# Patient Record
Sex: FEMALE | Race: BLACK OR AFRICAN AMERICAN | NOT HISPANIC OR LATINO | Employment: FULL TIME | URBAN - NONMETROPOLITAN AREA
[De-identification: names, ages, dates, MRNs, and addresses within clinical notes are randomized per-mention and may not be internally consistent; named-entity substitution may affect disease eponyms.]

---

## 2021-07-19 ENCOUNTER — OFFICE VISIT (OUTPATIENT)
Dept: FAMILY MEDICINE | Facility: CLINIC | Age: 66
End: 2021-07-19
Payer: COMMERCIAL

## 2021-07-19 VITALS
DIASTOLIC BLOOD PRESSURE: 88 MMHG | BODY MASS INDEX: 25.93 KG/M2 | HEIGHT: 65 IN | WEIGHT: 155.63 LBS | SYSTOLIC BLOOD PRESSURE: 150 MMHG | OXYGEN SATURATION: 98 % | RESPIRATION RATE: 16 BRPM | TEMPERATURE: 98 F | HEART RATE: 86 BPM

## 2021-07-19 DIAGNOSIS — E55.9 VITAMIN D DEFICIENCY: Chronic | ICD-10-CM

## 2021-07-19 DIAGNOSIS — E03.9 HYPOTHYROIDISM, UNSPECIFIED TYPE: Chronic | ICD-10-CM

## 2021-07-19 DIAGNOSIS — I10 HYPERTENSION, UNSPECIFIED TYPE: Primary | Chronic | ICD-10-CM

## 2021-07-19 PROCEDURE — 99214 PR OFFICE/OUTPT VISIT, EST, LEVL IV, 30-39 MIN: ICD-10-PCS | Mod: ,,, | Performed by: FAMILY MEDICINE

## 2021-07-19 PROCEDURE — 99214 OFFICE O/P EST MOD 30 MIN: CPT | Mod: ,,, | Performed by: FAMILY MEDICINE

## 2021-07-19 RX ORDER — LEVOTHYROXINE SODIUM 88 UG/1
1 TABLET ORAL DAILY
COMMUNITY
Start: 2021-05-22 | End: 2021-07-19 | Stop reason: SDUPTHER

## 2021-07-19 RX ORDER — POTASSIUM CHLORIDE 750 MG/1
10 TABLET, EXTENDED RELEASE ORAL DAILY
Qty: 90 TABLET | Refills: 1 | Status: SHIPPED | OUTPATIENT
Start: 2021-07-19 | End: 2022-02-25 | Stop reason: SDUPTHER

## 2021-07-19 RX ORDER — AMLODIPINE BESYLATE 10 MG/1
10 TABLET ORAL DAILY
Qty: 90 TABLET | Refills: 1 | Status: SHIPPED | OUTPATIENT
Start: 2021-07-19 | End: 2022-02-25 | Stop reason: SDUPTHER

## 2021-07-19 RX ORDER — LEVOTHYROXINE SODIUM 88 UG/1
88 TABLET ORAL DAILY
Qty: 90 TABLET | Refills: 1 | Status: SHIPPED | OUTPATIENT
Start: 2021-07-19 | End: 2022-02-25 | Stop reason: SDUPTHER

## 2021-07-19 RX ORDER — FERROUS SULFATE, DRIED 160(50) MG
1 TABLET, EXTENDED RELEASE ORAL 2 TIMES DAILY WITH MEALS
COMMUNITY

## 2021-07-19 RX ORDER — POTASSIUM CHLORIDE 750 MG/1
1 TABLET, EXTENDED RELEASE ORAL DAILY
COMMUNITY
Start: 2021-05-22 | End: 2021-07-19 | Stop reason: SDUPTHER

## 2021-07-19 RX ORDER — CARVEDILOL 6.25 MG/1
6.25 TABLET ORAL 2 TIMES DAILY
COMMUNITY
Start: 2021-05-22 | End: 2021-07-19 | Stop reason: SDUPTHER

## 2021-07-19 RX ORDER — AMLODIPINE BESYLATE 10 MG/1
1 TABLET ORAL DAILY
COMMUNITY
Start: 2021-05-22 | End: 2021-07-19 | Stop reason: SDUPTHER

## 2021-07-19 RX ORDER — LISINOPRIL 20 MG/1
20 TABLET ORAL DAILY
Qty: 90 TABLET | Refills: 1 | Status: SHIPPED | OUTPATIENT
Start: 2021-07-19 | End: 2022-02-25 | Stop reason: SDUPTHER

## 2021-07-19 RX ORDER — LISINOPRIL 20 MG/1
20 TABLET ORAL DAILY
COMMUNITY
Start: 2021-05-22 | End: 2021-07-19 | Stop reason: SDUPTHER

## 2021-07-19 RX ORDER — CARVEDILOL 6.25 MG/1
6.25 TABLET ORAL 2 TIMES DAILY
Qty: 90 TABLET | Refills: 1 | Status: SHIPPED | OUTPATIENT
Start: 2021-07-19 | End: 2021-11-22

## 2021-07-19 RX ORDER — ACETAMINOPHEN 500 MG
5000 TABLET ORAL DAILY
COMMUNITY

## 2021-07-19 RX ORDER — MAGNESIUM 250 MG
TABLET ORAL ONCE
COMMUNITY

## 2021-09-30 ENCOUNTER — OFFICE VISIT (OUTPATIENT)
Dept: FAMILY MEDICINE | Facility: CLINIC | Age: 66
End: 2021-09-30
Payer: MEDICARE

## 2021-09-30 VITALS
RESPIRATION RATE: 20 BRPM | HEIGHT: 65 IN | SYSTOLIC BLOOD PRESSURE: 146 MMHG | HEART RATE: 90 BPM | WEIGHT: 159 LBS | BODY MASS INDEX: 26.49 KG/M2 | DIASTOLIC BLOOD PRESSURE: 80 MMHG | OXYGEN SATURATION: 98 % | TEMPERATURE: 98 F

## 2021-09-30 DIAGNOSIS — R30.0 DYSURIA: Primary | ICD-10-CM

## 2021-09-30 LAB
BILIRUB SERPL-MCNC: NEGATIVE MG/DL
BLOOD URINE, POC: ABNORMAL
COLOR, POC UA: YELLOW
GLUCOSE UR QL STRIP: NEGATIVE
KETONES UR QL STRIP: NEGATIVE
LEUKOCYTE ESTERASE URINE, POC: NEGATIVE
NITRITE, POC UA: NEGATIVE
PH, POC UA: 7
PROTEIN, POC: NEGATIVE
SPECIFIC GRAVITY, POC UA: 1.02
UROBILINOGEN, POC UA: 0.2

## 2021-09-30 PROCEDURE — 3077F SYST BP >= 140 MM HG: CPT | Mod: ,,, | Performed by: NURSE PRACTITIONER

## 2021-09-30 PROCEDURE — 3008F PR BODY MASS INDEX (BMI) DOCUMENTED: ICD-10-PCS | Mod: ,,, | Performed by: NURSE PRACTITIONER

## 2021-09-30 PROCEDURE — 3079F DIAST BP 80-89 MM HG: CPT | Mod: ,,, | Performed by: NURSE PRACTITIONER

## 2021-09-30 PROCEDURE — 3008F BODY MASS INDEX DOCD: CPT | Mod: ,,, | Performed by: NURSE PRACTITIONER

## 2021-09-30 PROCEDURE — 81003 URINALYSIS AUTO W/O SCOPE: CPT | Mod: RHCUB | Performed by: NURSE PRACTITIONER

## 2021-09-30 PROCEDURE — 99213 PR OFFICE/OUTPT VISIT, EST, LEVL III, 20-29 MIN: ICD-10-PCS | Mod: ,,, | Performed by: NURSE PRACTITIONER

## 2021-09-30 PROCEDURE — 99213 OFFICE O/P EST LOW 20 MIN: CPT | Mod: ,,, | Performed by: NURSE PRACTITIONER

## 2021-09-30 PROCEDURE — 87086 URINE CULTURE/COLONY COUNT: CPT | Mod: ,,, | Performed by: CLINICAL MEDICAL LABORATORY

## 2021-09-30 PROCEDURE — 4010F PR ACE/ARB THEARPY RXD/TAKEN: ICD-10-PCS | Mod: ,,, | Performed by: NURSE PRACTITIONER

## 2021-09-30 PROCEDURE — 3079F PR MOST RECENT DIASTOLIC BLOOD PRESSURE 80-89 MM HG: ICD-10-PCS | Mod: ,,, | Performed by: NURSE PRACTITIONER

## 2021-09-30 PROCEDURE — 3077F PR MOST RECENT SYSTOLIC BLOOD PRESSURE >= 140 MM HG: ICD-10-PCS | Mod: ,,, | Performed by: NURSE PRACTITIONER

## 2021-09-30 PROCEDURE — 87086 CULTURE, URINE: ICD-10-PCS | Mod: ,,, | Performed by: CLINICAL MEDICAL LABORATORY

## 2021-09-30 PROCEDURE — 4010F ACE/ARB THERAPY RXD/TAKEN: CPT | Mod: ,,, | Performed by: NURSE PRACTITIONER

## 2021-09-30 RX ORDER — NITROFURANTOIN 25; 75 MG/1; MG/1
100 CAPSULE ORAL 2 TIMES DAILY
Qty: 14 CAPSULE | Refills: 0 | Status: SHIPPED | OUTPATIENT
Start: 2021-09-30 | End: 2022-02-25

## 2021-10-02 LAB — UA COMPLETE W REFLEX CULTURE PNL UR: NORMAL

## 2022-02-25 ENCOUNTER — OFFICE VISIT (OUTPATIENT)
Dept: FAMILY MEDICINE | Facility: CLINIC | Age: 67
End: 2022-02-25
Payer: MEDICARE

## 2022-02-25 VITALS
TEMPERATURE: 98 F | HEIGHT: 65 IN | DIASTOLIC BLOOD PRESSURE: 78 MMHG | RESPIRATION RATE: 20 BRPM | HEART RATE: 88 BPM | OXYGEN SATURATION: 98 % | SYSTOLIC BLOOD PRESSURE: 142 MMHG | WEIGHT: 157.81 LBS | BODY MASS INDEX: 26.29 KG/M2

## 2022-02-25 DIAGNOSIS — I10 PRIMARY HYPERTENSION: Primary | Chronic | ICD-10-CM

## 2022-02-25 DIAGNOSIS — E55.9 VITAMIN D DEFICIENCY: Chronic | ICD-10-CM

## 2022-02-25 DIAGNOSIS — E03.9 ACQUIRED HYPOTHYROIDISM: Chronic | ICD-10-CM

## 2022-02-25 LAB
ANION GAP SERPL CALCULATED.3IONS-SCNC: 9 MMOL/L (ref 7–16)
BASOPHILS # BLD AUTO: 0.01 K/UL (ref 0–0.2)
BASOPHILS NFR BLD AUTO: 0.2 % (ref 0–1)
BILIRUB UR QL STRIP: NEGATIVE
BUN SERPL-MCNC: 13 MG/DL (ref 7–18)
BUN/CREAT SERPL: 18 (ref 6–20)
CALCIUM SERPL-MCNC: 9.8 MG/DL (ref 8.5–10.1)
CHLORIDE SERPL-SCNC: 106 MMOL/L (ref 98–107)
CHOLEST SERPL-MCNC: 195 MG/DL (ref 0–200)
CHOLEST/HDLC SERPL: 2 {RATIO}
CLARITY UR: CLEAR
CO2 SERPL-SCNC: 28 MMOL/L (ref 21–32)
COLOR UR: YELLOW
CREAT SERPL-MCNC: 0.73 MG/DL (ref 0.55–1.02)
CREAT UR-MCNC: 32 MG/DL (ref 28–219)
DIFFERENTIAL METHOD BLD: ABNORMAL
EOSINOPHIL # BLD AUTO: 0.05 K/UL (ref 0–0.5)
EOSINOPHIL NFR BLD AUTO: 1.2 % (ref 1–4)
ERYTHROCYTE [DISTWIDTH] IN BLOOD BY AUTOMATED COUNT: 13.5 % (ref 11.5–14.5)
GLUCOSE SERPL-MCNC: 87 MG/DL (ref 74–106)
GLUCOSE UR STRIP-MCNC: NEGATIVE MG/DL
HCT VFR BLD AUTO: 37 % (ref 38–47)
HDLC SERPL-MCNC: 96 MG/DL (ref 40–60)
HGB BLD-MCNC: 11.4 G/DL (ref 12–16)
IMM GRANULOCYTES # BLD AUTO: 0.02 K/UL (ref 0–0.04)
IMM GRANULOCYTES NFR BLD: 0.5 % (ref 0–0.4)
KETONES UR STRIP-SCNC: NEGATIVE MG/DL
LDLC SERPL CALC-MCNC: 86 MG/DL
LDLC/HDLC SERPL: 0.9 {RATIO}
LEUKOCYTE ESTERASE UR QL STRIP: NEGATIVE
LYMPHOCYTES # BLD AUTO: 1.62 K/UL (ref 1–4.8)
LYMPHOCYTES NFR BLD AUTO: 37.4 % (ref 27–41)
MCH RBC QN AUTO: 29.4 PG (ref 27–31)
MCHC RBC AUTO-ENTMCNC: 30.8 G/DL (ref 32–36)
MCV RBC AUTO: 95.4 FL (ref 80–96)
MICROALBUMIN UR-MCNC: 1.4 MG/DL (ref 0–2.8)
MICROALBUMIN/CREAT RATIO PNL UR: 43.8 MG/G (ref 0–30)
MONOCYTES # BLD AUTO: 0.38 K/UL (ref 0–0.8)
MONOCYTES NFR BLD AUTO: 8.8 % (ref 2–6)
MPC BLD CALC-MCNC: 12 FL (ref 9.4–12.4)
NEUTROPHILS # BLD AUTO: 2.25 K/UL (ref 1.8–7.7)
NEUTROPHILS NFR BLD AUTO: 51.9 % (ref 53–65)
NITRITE UR QL STRIP: NEGATIVE
NONHDLC SERPL-MCNC: 99 MG/DL
NRBC # BLD AUTO: 0 X10E3/UL
NRBC, AUTO (.00): 0 %
PH UR STRIP: 7 PH UNITS
PLATELET # BLD AUTO: 275 K/UL (ref 150–400)
POTASSIUM SERPL-SCNC: 4.1 MMOL/L (ref 3.5–5.1)
PROT UR QL STRIP: NEGATIVE
RBC # BLD AUTO: 3.88 M/UL (ref 4.2–5.4)
RBC # UR STRIP: NEGATIVE /UL
SODIUM SERPL-SCNC: 139 MMOL/L (ref 136–145)
SP GR UR STRIP: 1.01
T4 FREE SERPL-MCNC: 1.37 NG/DL (ref 0.76–1.46)
TRIGL SERPL-MCNC: 66 MG/DL (ref 35–150)
TSH SERPL DL<=0.005 MIU/L-ACNC: 0.38 UIU/ML (ref 0.36–3.74)
UROBILINOGEN UR STRIP-ACNC: 0.2 MG/DL
VLDLC SERPL-MCNC: 13 MG/DL
WBC # BLD AUTO: 4.33 K/UL (ref 4.5–11)

## 2022-02-25 PROCEDURE — 84443 THYROID PANEL: ICD-10-PCS | Mod: ,,, | Performed by: CLINICAL MEDICAL LABORATORY

## 2022-02-25 PROCEDURE — 80061 LIPID PANEL: CPT | Mod: ,,, | Performed by: CLINICAL MEDICAL LABORATORY

## 2022-02-25 PROCEDURE — 81003 URINALYSIS AUTO W/O SCOPE: CPT | Mod: QW,,, | Performed by: CLINICAL MEDICAL LABORATORY

## 2022-02-25 PROCEDURE — 4010F PR ACE/ARB THEARPY RXD/TAKEN: ICD-10-PCS | Mod: ,,, | Performed by: FAMILY MEDICINE

## 2022-02-25 PROCEDURE — 3077F SYST BP >= 140 MM HG: CPT | Mod: ,,, | Performed by: FAMILY MEDICINE

## 2022-02-25 PROCEDURE — 99214 OFFICE O/P EST MOD 30 MIN: CPT | Mod: ,,, | Performed by: FAMILY MEDICINE

## 2022-02-25 PROCEDURE — 1126F AMNT PAIN NOTED NONE PRSNT: CPT | Mod: ,,, | Performed by: FAMILY MEDICINE

## 2022-02-25 PROCEDURE — 82043 UR ALBUMIN QUANTITATIVE: CPT | Mod: ,,, | Performed by: CLINICAL MEDICAL LABORATORY

## 2022-02-25 PROCEDURE — 81003 URINALYSIS: ICD-10-PCS | Mod: QW,,, | Performed by: CLINICAL MEDICAL LABORATORY

## 2022-02-25 PROCEDURE — 99214 PR OFFICE/OUTPT VISIT, EST, LEVL IV, 30-39 MIN: ICD-10-PCS | Mod: ,,, | Performed by: FAMILY MEDICINE

## 2022-02-25 PROCEDURE — 3077F PR MOST RECENT SYSTOLIC BLOOD PRESSURE >= 140 MM HG: ICD-10-PCS | Mod: ,,, | Performed by: FAMILY MEDICINE

## 2022-02-25 PROCEDURE — 82570 ASSAY OF URINE CREATININE: CPT | Mod: ,,, | Performed by: CLINICAL MEDICAL LABORATORY

## 2022-02-25 PROCEDURE — 1159F MED LIST DOCD IN RCRD: CPT | Mod: ,,, | Performed by: FAMILY MEDICINE

## 2022-02-25 PROCEDURE — 85025 CBC WITH DIFFERENTIAL: ICD-10-PCS | Mod: ,,, | Performed by: CLINICAL MEDICAL LABORATORY

## 2022-02-25 PROCEDURE — 3008F BODY MASS INDEX DOCD: CPT | Mod: ,,, | Performed by: FAMILY MEDICINE

## 2022-02-25 PROCEDURE — 4010F ACE/ARB THERAPY RXD/TAKEN: CPT | Mod: ,,, | Performed by: FAMILY MEDICINE

## 2022-02-25 PROCEDURE — 80061 LIPID PANEL: ICD-10-PCS | Mod: ,,, | Performed by: CLINICAL MEDICAL LABORATORY

## 2022-02-25 PROCEDURE — 3008F PR BODY MASS INDEX (BMI) DOCUMENTED: ICD-10-PCS | Mod: ,,, | Performed by: FAMILY MEDICINE

## 2022-02-25 PROCEDURE — 85025 COMPLETE CBC W/AUTO DIFF WBC: CPT | Mod: ,,, | Performed by: CLINICAL MEDICAL LABORATORY

## 2022-02-25 PROCEDURE — 1160F PR REVIEW ALL MEDS BY PRESCRIBER/CLIN PHARMACIST DOCUMENTED: ICD-10-PCS | Mod: ,,, | Performed by: FAMILY MEDICINE

## 2022-02-25 PROCEDURE — 3078F DIAST BP <80 MM HG: CPT | Mod: ,,, | Performed by: FAMILY MEDICINE

## 2022-02-25 PROCEDURE — 3078F PR MOST RECENT DIASTOLIC BLOOD PRESSURE < 80 MM HG: ICD-10-PCS | Mod: ,,, | Performed by: FAMILY MEDICINE

## 2022-02-25 PROCEDURE — 1160F RVW MEDS BY RX/DR IN RCRD: CPT | Mod: ,,, | Performed by: FAMILY MEDICINE

## 2022-02-25 PROCEDURE — 84439 ASSAY OF FREE THYROXINE: CPT | Mod: ,,, | Performed by: CLINICAL MEDICAL LABORATORY

## 2022-02-25 PROCEDURE — 1126F PR PAIN SEVERITY QUANTIFIED, NO PAIN PRESENT: ICD-10-PCS | Mod: ,,, | Performed by: FAMILY MEDICINE

## 2022-02-25 PROCEDURE — 80048 BASIC METABOLIC PNL TOTAL CA: CPT | Mod: ,,, | Performed by: CLINICAL MEDICAL LABORATORY

## 2022-02-25 PROCEDURE — 84439 THYROID PANEL: ICD-10-PCS | Mod: ,,, | Performed by: CLINICAL MEDICAL LABORATORY

## 2022-02-25 PROCEDURE — 82570 MICROALBUMIN / CREATININE RATIO URINE: ICD-10-PCS | Mod: ,,, | Performed by: CLINICAL MEDICAL LABORATORY

## 2022-02-25 PROCEDURE — 82043 MICROALBUMIN / CREATININE RATIO URINE: ICD-10-PCS | Mod: ,,, | Performed by: CLINICAL MEDICAL LABORATORY

## 2022-02-25 PROCEDURE — 84443 ASSAY THYROID STIM HORMONE: CPT | Mod: ,,, | Performed by: CLINICAL MEDICAL LABORATORY

## 2022-02-25 PROCEDURE — 80048 BASIC METABOLIC PANEL: ICD-10-PCS | Mod: ,,, | Performed by: CLINICAL MEDICAL LABORATORY

## 2022-02-25 PROCEDURE — 1159F PR MEDICATION LIST DOCUMENTED IN MEDICAL RECORD: ICD-10-PCS | Mod: ,,, | Performed by: FAMILY MEDICINE

## 2022-02-25 RX ORDER — LISINOPRIL 20 MG/1
20 TABLET ORAL DAILY
Qty: 90 TABLET | Refills: 1 | Status: SHIPPED | OUTPATIENT
Start: 2022-02-25 | End: 2022-08-29 | Stop reason: SDUPTHER

## 2022-02-25 RX ORDER — LEVOTHYROXINE SODIUM 88 UG/1
88 TABLET ORAL DAILY
Qty: 90 TABLET | Refills: 1 | Status: SHIPPED | OUTPATIENT
Start: 2022-02-25 | End: 2022-08-29 | Stop reason: SDUPTHER

## 2022-02-25 RX ORDER — AMLODIPINE BESYLATE 10 MG/1
10 TABLET ORAL DAILY
Qty: 90 TABLET | Refills: 1 | Status: SHIPPED | OUTPATIENT
Start: 2022-02-25 | End: 2022-08-29 | Stop reason: SDUPTHER

## 2022-02-25 RX ORDER — CARVEDILOL 6.25 MG/1
6.25 TABLET ORAL 2 TIMES DAILY
Qty: 180 TABLET | Refills: 1 | Status: SHIPPED | OUTPATIENT
Start: 2022-02-25 | End: 2022-08-29 | Stop reason: SDUPTHER

## 2022-02-25 RX ORDER — POTASSIUM CHLORIDE 750 MG/1
10 TABLET, EXTENDED RELEASE ORAL DAILY
Qty: 90 TABLET | Refills: 1 | Status: SHIPPED | OUTPATIENT
Start: 2022-02-25 | End: 2022-08-29 | Stop reason: SDUPTHER

## 2022-02-25 NOTE — PROGRESS NOTES
Myles Sweet MD    [address] [place] [address]     PATIENT NAME: Yoanna Hurtado  : 1955  DATE: 22  MRN: [id number]      Billing Provider: Myles Sweet MD  Level of Service: DE OFFICE/OUTPT VISIT, EST, LEVL IV, 30-39 MIN  Patient PCP Information     Provider PCP Type    Myles Sweet MD General          Reason for Visit / Chief Complaint: Follow-up (Follow up on chronic health problems and medication refills)       Update PCP  Update Chief Complaint         History of Present Illness / Problem Focused Workflow     Yoanna Hurtado presents to the clinic with Follow-up (Follow up on chronic health problems and medication refills)     HPI    Review of Systems     Review of Systems   Constitutional: Negative for activity change, appetite change, chills, fatigue and fever.   HENT: Negative for nasal congestion, ear pain, hearing loss, postnasal drip and sore throat.    Respiratory: Negative for cough, chest tightness, shortness of breath and wheezing.    Cardiovascular: Negative for chest pain, palpitations, leg swelling and claudication.   Gastrointestinal: Negative for abdominal pain, change in bowel habit, constipation, diarrhea, nausea, vomiting and change in bowel habit.   Genitourinary: Negative for dysuria.   Musculoskeletal: Negative for arthralgias, back pain, gait problem and myalgias.   Integumentary:  Negative for rash.   Neurological: Negative for weakness and headaches.   Psychiatric/Behavioral: Negative for suicidal ideas. The patient is not nervous/anxious.         Medical / Social / Family History     Past Medical History:   Diagnosis Date    Hypothyroidism        Past Surgical History:   Procedure Laterality Date    HYSTERECTOMY      TOTAL THYROIDECTOMY         Social History  Ms.  reports that she has never smoked. She has never used smokeless tobacco. She reports that she does not drink alcohol and does not use  drugs.    Family History  Ms.'s family history is not on file.    Medications and Allergies     Medications  Outpatient Medications Marked as Taking for the 2/25/22 encounter (Office Visit) with Myles Sweet MD   Medication Sig Dispense Refill    calcium-vitamin D3 (OS-HORTENCIA 500 + D3) 500 mg-5 mcg (200 unit) per tablet Take 1 tablet by mouth 2 (two) times daily with meals.      cholecalciferol, vitamin D3, 125 mcg (5,000 unit) Tab Take 5,000 Units by mouth once daily.      magnesium 250 mg Tab Take by mouth once.      [DISCONTINUED] amLODIPine (NORVASC) 10 MG tablet Take 1 tablet (10 mg total) by mouth once daily. 90 tablet 1    [DISCONTINUED] carvediloL (COREG) 6.25 MG tablet Take 1 tablet by mouth twice daily 180 tablet 0    [DISCONTINUED] lisinopriL (PRINIVIL,ZESTRIL) 20 MG tablet Take 1 tablet (20 mg total) by mouth once daily. 90 tablet 1    [DISCONTINUED] potassium chloride (KLOR-CON) 10 MEQ TbSR Take 1 tablet (10 mEq total) by mouth once daily. 90 tablet 1    [DISCONTINUED] SYNTHROID 88 mcg tablet Take 1 tablet (88 mcg total) by mouth once daily. 90 tablet 1       Allergies  Review of patient's allergies indicates:   Allergen Reactions    Penicillins     Sulfa (sulfonamide antibiotics)        Physical Examination     Vitals:    02/25/22 0824   BP: (!) 142/78   Pulse:    Resp:    Temp:      Physical Exam  Vitals and nursing note reviewed.   Constitutional:       General: She is not in acute distress.     Appearance: Normal appearance. She is not ill-appearing.   Eyes:      Extraocular Movements: Extraocular movements intact.      Pupils: Pupils are equal, round, and reactive to light.   Cardiovascular:      Rate and Rhythm: Normal rate and regular rhythm.      Heart sounds: Normal heart sounds.   Pulmonary:      Effort: Pulmonary effort is normal.      Breath sounds: Normal breath sounds.   Abdominal:      General: Bowel sounds are normal.      Palpations: Abdomen is soft.   Musculoskeletal:          General: Normal range of motion.   Skin:     Findings: No rash.   Neurological:      General: No focal deficit present.      Mental Status: She is alert and oriented to person, place, and time. Mental status is at baseline.   Psychiatric:         Mood and Affect: Mood normal.         Behavior: Behavior normal.          Assessment and Plan (including Health Maintenance)      Problem List  Smart Sets  Document Outside HM   :    Plan:         Health Maintenance Due   Topic Date Due    TETANUS VACCINE  Never done    Mammogram  Never done    DEXA Scan  Never done    Colorectal Cancer Screening  Never done       Problem List Items Addressed This Visit        Cardiac/Vascular    Hypertension - Primary (Chronic)    Current Assessment & Plan      - Goal blood pressure for most patients is less than 130/85. Both numbers are important. If you have questions about your specific goal blood pressure, please ask at your clinic visits.   - Check blood pressure outside of clinic, record the numbers, and bring the log to all your office visits.   - Take a daily, 81mg Aspirin, unless instructed to take a different dose for another medical purpose. Also do not take Aspirin if you have a history of adverse reaction to Aspirin.   - If you have any chest pain, SOB, or other concerning symptoms, report these immediately, or go to the nearest ER.    - Recommend cardiovascular exercise, at least 3 times per week, for at least 15 minutes.             Relevant Medications    lisinopriL (PRINIVIL,ZESTRIL) 20 MG tablet    carvediloL (COREG) 6.25 MG tablet    amLODIPine (NORVASC) 10 MG tablet    Other Relevant Orders    Basic Metabolic Panel    CBC Auto Differential    Lipid Panel    Microalbumin/Creatinine Ratio, Urine    Urinalysis       Endocrine    Hypothyroidism (Chronic)    Relevant Medications    SYNTHROID 88 mcg tablet    Other Relevant Orders    Thyroid Panel    Vitamin D deficiency (Chronic)        Primary hypertension  -      lisinopriL (PRINIVIL,ZESTRIL) 20 MG tablet; Take 1 tablet (20 mg total) by mouth once daily. For BLOOD PRESSURE  Dispense: 90 tablet; Refill: 1  -     carvediloL (COREG) 6.25 MG tablet; Take 1 tablet (6.25 mg total) by mouth 2 (two) times daily. For BLOOD PRESSURE  Dispense: 180 tablet; Refill: 1  -     amLODIPine (NORVASC) 10 MG tablet; Take 1 tablet (10 mg total) by mouth once daily. For BLOOD PRESSURE  Dispense: 90 tablet; Refill: 1  -     Basic Metabolic Panel; Future; Expected date: 02/25/2022  -     CBC Auto Differential; Future; Expected date: 02/25/2022  -     Lipid Panel; Future; Expected date: 02/25/2022  -     Microalbumin/Creatinine Ratio, Urine; Future; Expected date: 02/25/2022  -     Urinalysis; Future; Expected date: 02/25/2022    Acquired hypothyroidism  -     SYNTHROID 88 mcg tablet; Take 1 tablet (88 mcg total) by mouth once daily. For THYROID  Dispense: 90 tablet; Refill: 1  -     Thyroid Panel; Future; Expected date: 02/25/2022    Vitamin D deficiency    Other orders  -     potassium chloride (KLOR-CON) 10 MEQ TbSR; Take 1 tablet (10 mEq total) by mouth once daily.  Dispense: 90 tablet; Refill: 1       Health Maintenance Topics with due status: Not Due       Topic Last Completion Date    Lipid Panel 02/08/2021       Procedures     Future Appointments   Date Time Provider Department Center   8/15/2022 10:00 AM AWV NURSE, [facility] FAMILY [facility]IC[facility] KAYLEIGH Morales        Follow up in about 6 months (around 8/25/2022) for chronic health problems.     Signature:  Myles Sweet MD  [address] [place] [address]  Phone #: [phone]  Fax #: [phone]    Date of encounter: 2/25/22    There are no Patient Instructions on file for this visit.

## 2022-08-29 ENCOUNTER — OFFICE VISIT (OUTPATIENT)
Dept: FAMILY MEDICINE | Facility: CLINIC | Age: 67
End: 2022-08-29
Payer: MEDICARE

## 2022-08-29 VITALS
RESPIRATION RATE: 18 BRPM | SYSTOLIC BLOOD PRESSURE: 138 MMHG | HEART RATE: 98 BPM | BODY MASS INDEX: 25.76 KG/M2 | HEIGHT: 65 IN | TEMPERATURE: 98 F | OXYGEN SATURATION: 97 % | WEIGHT: 154.63 LBS | DIASTOLIC BLOOD PRESSURE: 72 MMHG

## 2022-08-29 DIAGNOSIS — Z11.59 SCREENING FOR VIRAL DISEASE: ICD-10-CM

## 2022-08-29 DIAGNOSIS — E03.9 ACQUIRED HYPOTHYROIDISM: Chronic | ICD-10-CM

## 2022-08-29 DIAGNOSIS — E89.40 POST HYSTERECTOMY MENOPAUSE: ICD-10-CM

## 2022-08-29 DIAGNOSIS — E55.9 VITAMIN D DEFICIENCY: Chronic | ICD-10-CM

## 2022-08-29 DIAGNOSIS — Z90.710 POST HYSTERECTOMY MENOPAUSE: ICD-10-CM

## 2022-08-29 DIAGNOSIS — Z12.31 ENCOUNTER FOR SCREENING MAMMOGRAM FOR MALIGNANT NEOPLASM OF BREAST: ICD-10-CM

## 2022-08-29 DIAGNOSIS — I10 PRIMARY HYPERTENSION: Chronic | ICD-10-CM

## 2022-08-29 DIAGNOSIS — Z00.00 ENCOUNTER FOR INITIAL ANNUAL WELLNESS VISIT (AWV) IN MEDICARE PATIENT: Primary | ICD-10-CM

## 2022-08-29 LAB — HCV AB SER QL: NORMAL

## 2022-08-29 PROCEDURE — G0438 PPPS, INITIAL VISIT: HCPCS | Mod: ,,, | Performed by: FAMILY MEDICINE

## 2022-08-29 PROCEDURE — 3060F POS MICROALBUMINURIA REV: CPT | Mod: ,,, | Performed by: FAMILY MEDICINE

## 2022-08-29 PROCEDURE — 1101F PR PT FALLS ASSESS DOC 0-1 FALLS W/OUT INJ PAST YR: ICD-10-PCS | Mod: ,,, | Performed by: FAMILY MEDICINE

## 2022-08-29 PROCEDURE — 1126F PR PAIN SEVERITY QUANTIFIED, NO PAIN PRESENT: ICD-10-PCS | Mod: ,,, | Performed by: FAMILY MEDICINE

## 2022-08-29 PROCEDURE — 3008F PR BODY MASS INDEX (BMI) DOCUMENTED: ICD-10-PCS | Mod: ,,, | Performed by: FAMILY MEDICINE

## 2022-08-29 PROCEDURE — 3078F DIAST BP <80 MM HG: CPT | Mod: ,,, | Performed by: FAMILY MEDICINE

## 2022-08-29 PROCEDURE — 3075F PR MOST RECENT SYSTOLIC BLOOD PRESS GE 130-139MM HG: ICD-10-PCS | Mod: ,,, | Performed by: FAMILY MEDICINE

## 2022-08-29 PROCEDURE — 1159F PR MEDICATION LIST DOCUMENTED IN MEDICAL RECORD: ICD-10-PCS | Mod: ,,, | Performed by: FAMILY MEDICINE

## 2022-08-29 PROCEDURE — 1126F AMNT PAIN NOTED NONE PRSNT: CPT | Mod: ,,, | Performed by: FAMILY MEDICINE

## 2022-08-29 PROCEDURE — 1159F MED LIST DOCD IN RCRD: CPT | Mod: ,,, | Performed by: FAMILY MEDICINE

## 2022-08-29 PROCEDURE — 3288F PR FALLS RISK ASSESSMENT DOCUMENTED: ICD-10-PCS | Mod: ,,, | Performed by: FAMILY MEDICINE

## 2022-08-29 PROCEDURE — 1160F RVW MEDS BY RX/DR IN RCRD: CPT | Mod: ,,, | Performed by: FAMILY MEDICINE

## 2022-08-29 PROCEDURE — 3288F FALL RISK ASSESSMENT DOCD: CPT | Mod: ,,, | Performed by: FAMILY MEDICINE

## 2022-08-29 PROCEDURE — 3066F NEPHROPATHY DOC TX: CPT | Mod: ,,, | Performed by: FAMILY MEDICINE

## 2022-08-29 PROCEDURE — G0438 PR WELCOME MEDICARE ANNUAL WELLNESS INITIAL VISIT: ICD-10-PCS | Mod: ,,, | Performed by: FAMILY MEDICINE

## 2022-08-29 PROCEDURE — 1160F PR REVIEW ALL MEDS BY PRESCRIBER/CLIN PHARMACIST DOCUMENTED: ICD-10-PCS | Mod: ,,, | Performed by: FAMILY MEDICINE

## 2022-08-29 PROCEDURE — 3075F SYST BP GE 130 - 139MM HG: CPT | Mod: ,,, | Performed by: FAMILY MEDICINE

## 2022-08-29 PROCEDURE — 3078F PR MOST RECENT DIASTOLIC BLOOD PRESSURE < 80 MM HG: ICD-10-PCS | Mod: ,,, | Performed by: FAMILY MEDICINE

## 2022-08-29 PROCEDURE — 4010F PR ACE/ARB THEARPY RXD/TAKEN: ICD-10-PCS | Mod: ,,, | Performed by: FAMILY MEDICINE

## 2022-08-29 PROCEDURE — 3008F BODY MASS INDEX DOCD: CPT | Mod: ,,, | Performed by: FAMILY MEDICINE

## 2022-08-29 PROCEDURE — 86803 HEPATITIS C AB TEST: CPT | Mod: ,,, | Performed by: CLINICAL MEDICAL LABORATORY

## 2022-08-29 PROCEDURE — 3066F PR DOCUMENTATION OF TREATMENT FOR NEPHROPATHY: ICD-10-PCS | Mod: ,,, | Performed by: FAMILY MEDICINE

## 2022-08-29 PROCEDURE — 3060F PR POS MICROALBUMINURIA RESULT DOCUMENTED/REVIEW: ICD-10-PCS | Mod: ,,, | Performed by: FAMILY MEDICINE

## 2022-08-29 PROCEDURE — 1101F PT FALLS ASSESS-DOCD LE1/YR: CPT | Mod: ,,, | Performed by: FAMILY MEDICINE

## 2022-08-29 PROCEDURE — 4010F ACE/ARB THERAPY RXD/TAKEN: CPT | Mod: ,,, | Performed by: FAMILY MEDICINE

## 2022-08-29 PROCEDURE — 86803 HEPATITIS C ANTIBODY: ICD-10-PCS | Mod: ,,, | Performed by: CLINICAL MEDICAL LABORATORY

## 2022-08-29 RX ORDER — LISINOPRIL 20 MG/1
20 TABLET ORAL DAILY
Qty: 90 TABLET | Refills: 1 | Status: SHIPPED | OUTPATIENT
Start: 2022-08-29 | End: 2023-02-27 | Stop reason: SDUPTHER

## 2022-08-29 RX ORDER — LEVOTHYROXINE SODIUM 88 UG/1
88 TABLET ORAL DAILY
Qty: 90 TABLET | Refills: 1 | Status: SHIPPED | OUTPATIENT
Start: 2022-08-29 | End: 2023-02-27 | Stop reason: SDUPTHER

## 2022-08-29 RX ORDER — FLUTICASONE PROPIONATE 50 MCG
1 SPRAY, SUSPENSION (ML) NASAL DAILY
COMMUNITY
End: 2023-02-27 | Stop reason: SDUPTHER

## 2022-08-29 RX ORDER — CARVEDILOL 6.25 MG/1
6.25 TABLET ORAL 2 TIMES DAILY
Qty: 180 TABLET | Refills: 1 | Status: SHIPPED | OUTPATIENT
Start: 2022-08-29 | End: 2023-02-27 | Stop reason: SDUPTHER

## 2022-08-29 RX ORDER — POTASSIUM CHLORIDE 750 MG/1
10 TABLET, EXTENDED RELEASE ORAL DAILY
Qty: 90 TABLET | Refills: 1 | Status: SHIPPED | OUTPATIENT
Start: 2022-08-29 | End: 2023-08-31 | Stop reason: SDUPTHER

## 2022-08-29 RX ORDER — AMLODIPINE BESYLATE 10 MG/1
10 TABLET ORAL DAILY
Qty: 90 TABLET | Refills: 1 | Status: SHIPPED | OUTPATIENT
Start: 2022-08-29 | End: 2023-02-27 | Stop reason: SDUPTHER

## 2022-08-29 NOTE — PATIENT INSTRUCTIONS
Counseling and Referral of Other Preventative  (Italic type indicates deductible and co-insurance are waived)    Patient Name: Yoanna Hurtado  Today's Date: 8/29/2022    Health Maintenance         Date Due Completion Date    Hepatitis C Screening Never done ---    Mammogram Never done ---    DEXA Scan Never done ---    Shingles Vaccine (1 of 2) 02/25/2023 (Originally 8/17/2006) ---    Pneumococcal Vaccines (Age 65+) (1 - PCV) 02/25/2023 (Originally 8/17/2021) ---    TETANUS VACCINE 08/29/2023 (Originally 8/17/1974) ---    COVID-19 Vaccine (4 - Booster for Pfizer series) 08/29/2023 (Originally 2/23/2022) 10/23/2021    Influenza Vaccine (1) 09/01/2022 ---    Lipid Panel 02/25/2023 2/25/2022    Override on 2/8/2021: Done    Colorectal Cancer Screening 10/05/2025 10/5/2015          Orders Placed This Encounter   Procedures    Mammo Digital Screening Bilat    DXA Bone Density Spine And Hip    Hepatitis C Antibody

## 2022-08-29 NOTE — PROGRESS NOTES
[facility]      PATIENT NAME: Yoanna Hurtado   : 1955    AGE: 67 y.o. DATE: 2022   MRN: [id number]        Reason for Visit / Chief Complaint: Medicare AWV (Initial Medicare AWV )        Yoanna Hurtado presents for an Initial Medicare AWV today.     The following components were reviewed and updated:    Medical/Social/Family History:  Past Medical History:   Diagnosis Date    Hypertension     Hypothyroidism         History reviewed. No pertinent family history.     Social History     Tobacco Use   Smoking Status Never   Smokeless Tobacco Never      Social History     Substance and Sexual Activity   Alcohol Use Never       History reviewed. No pertinent family history.    Past Surgical History:   Procedure Laterality Date    HYSTERECTOMY      TOTAL THYROIDECTOMY           Allergies and Current Medications     Review of patient's allergies indicates:   Allergen Reactions    Penicillins     Sulfa (sulfonamide antibiotics)        Current Outpatient Medications:     calcium-vitamin D3 (OS-HORTENCIA 500 + D3) 500 mg-5 mcg (200 unit) per tablet, Take 1 tablet by mouth 2 (two) times daily with meals., Disp: , Rfl:     cholecalciferol, vitamin D3, 125 mcg (5,000 unit) Tab, Take 5,000 Units by mouth once daily., Disp: , Rfl:     fluticasone propionate (FLONASE) 50 mcg/actuation nasal spray, 1 spray by Each Nostril route once daily., Disp: , Rfl:     magnesium 250 mg Tab, Take by mouth once., Disp: , Rfl:     amLODIPine (NORVASC) 10 MG tablet, Take 1 tablet (10 mg total) by mouth once daily. For BLOOD PRESSURE, Disp: 90 tablet, Rfl: 1    carvediloL (COREG) 6.25 MG tablet, Take 1 tablet (6.25 mg total) by mouth 2 (two) times daily. For BLOOD PRESSURE, Disp: 180 tablet, Rfl: 1    lisinopriL (PRINIVIL,ZESTRIL) 20 MG tablet, Take 1 tablet (20 mg total) by mouth once daily. For BLOOD PRESSURE, Disp: 90 tablet, Rfl: 1    potassium chloride (KLOR-CON) 10 MEQ TbSR, Take 1  tablet (10 mEq total) by mouth once daily., Disp: 90 tablet, Rfl: 1    SYNTHROID 88 mcg tablet, Take 1 tablet (88 mcg total) by mouth once daily. For THYROID, Disp: 90 tablet, Rfl: 1    Health Risk Assessment   Fall Risk: no   Advance Directive:  Does not have an advanced directive. Verbal education and written education included in today's AVS.   Depression: PHQ9 score: 0   HTN:  DASH diet, exercise, weight management, med compliance, home BP monitoring, and follow-up discussed.   T2DM: No   Tobacco use: no  STI: not at risk    Alcohol misuse: no   Statin Use: no      Health Risk Assessment  What is your age?: 65-69  Are you male or female?: Female  During the past four weeks, how much have you been bothered by emotional problems such as feeling anxious, depressed, irritable, sad, or downhearted and blue?: Not at all  During the past five weeks, has your physical and/or emotional health limited your social activities with family, friends, neighbors, or groups?: Not at all  During the past four weeks, how much bodily pain have you generally had?: Mild pain  During the past four weeks, was someone available to help if you needed and wanted help?: Yes, as much as I wanted  During the past four weeks, what was the hardest physical activity you could do for at least two minutes?: Very heavy  Can you get to places out of walking distance without help?  (For example, can you travel alone on buses or taxis, or drive your own car?): Yes  Can you go shopping for groceries or clothes without someone's help?: Yes  Can you prepare your own meals?: Yes  Can you do your own housework without help?: Yes  Because of any health problems, do you need the help of another person with your personal care needs such as eating, bathing, dressing, or getting around the house?: No  Can you handle your own money without help?: Yes  During the past four weeks, how would you rate your health in general?: Very good  How have things been going  for you during the past four weeks?: Very well  Are you having difficulties driving your car?: No  Do you always fasten your seat belt when you are in a car?: Yes, usually  How often in the past four weeks have you been bothered by falling or dizzy when standing up?: Never  How often in the past four weeks have you been bothered by sexual problems?: Never  How often in the past four weeks have you been bothered by trouble eating well?: Never  How often in the past four weeks have you been bothered by teeth or denture problems?: Seldom  How often in the past four weeks have you been bothered with problems using the telephone?: Never  How often in the past four weeks have you been bothered by tiredness or fatigue?: Seldom  Have you fallen two or more times in the past year?: No  Are you afraid of falling?: No  Are you a smoker?: No  During the past four weeks, how many drinks of wine, beer, or other alcoholic beverages did you have?: No alcohol at all  Do you exercise for about 20 minutes three or more days a week?: Yes, most of the time  Have you been given any information to help you with hazards in your house that might hurt you?: Yes  Have you been given any information to help you with keeping track of your medications?: Yes  How often do you have trouble taking medicines the way you've been told to take them?: I always take them as prescribed  How confident are you that you can control and manage most of your health problems?: Very confident  What is your race? (Check all that apply.):     Health Maintenance       Health Maintenance Topics with due status: Not Due       Topic Last Completion Date    Colorectal Cancer Screening 10/05/2015    Lipid Panel 02/25/2022    Influenza Vaccine Not Due     Health Maintenance Due   Topic Date Due    Hepatitis C Screening  Never done    Mammogram  Never done    DEXA Scan  Never done       Incontinence  Bowel: no  Bladder: no    Lab results available in  "Epic or see dates from [facility] above:   Lab Results   Component Value Date    CHOL 195 02/25/2022     Lab Results   Component Value Date    HDL 96 (H) 02/25/2022     Lab Results   Component Value Date    LDLCALC 86 02/25/2022     Lab Results   Component Value Date    TRIG 66 02/25/2022     Lab Results   Component Value Date    CHOLHDL 2.0 02/25/2022       No results found for: LABA1C, HGBA1C    Sodium   Date Value Ref Range Status   02/25/2022 139 136 - 145 mmol/L Final     Potassium   Date Value Ref Range Status   02/25/2022 4.1 3.5 - 5.1 mmol/L Final     Chloride   Date Value Ref Range Status   02/25/2022 106 98 - 107 mmol/L Final     CO2   Date Value Ref Range Status   02/25/2022 28 21 - 32 mmol/L Final     Glucose   Date Value Ref Range Status   02/25/2022 87 74 - 106 mg/dL Final     BUN   Date Value Ref Range Status   02/25/2022 13 7 - 18 mg/dL Final     Creatinine   Date Value Ref Range Status   02/25/2022 0.73 0.55 - 1.02 mg/dL Final     Calcium   Date Value Ref Range Status   02/25/2022 9.8 8.5 - 10.1 mg/dL Final     Anion Gap   Date Value Ref Range Status   02/25/2022 9 7 - 16 mmol/L Final     eGFR   Date Value Ref Range Status   02/25/2022 85 >=60 mL/min/1.73m² Final           Care Team   PCP:          **See Completed Assessments for Annual Wellness visit within the encounter summary    The following assessments were completed & reviewed:  Depression Screening  Cognitive function Screening  Timed Get Up Test  Whisper Test  Vision Screen  Health Risk Assessment  Checklist of ADLs and IADLs      Objective  Vitals:    08/29/22 0827   BP: 138/72   Pulse: 98   Resp: 18   Temp: 98.4 °F (36.9 °C)   TempSrc: Oral   SpO2: 97%   Weight: 70.1 kg (154 lb 9.6 oz)   Height: 5' 5" (1.651 m)   PainSc: 0-No pain      Body mass index is 25.73 kg/m².  Ideal body weight: 57 kg (125 lb 10.6 oz)       Physical Exam  Vitals and nursing note reviewed.   Constitutional:       General: She is not in acute distress.     " Appearance: Normal appearance. She is not ill-appearing.   Eyes:      Extraocular Movements: Extraocular movements intact.      Pupils: Pupils are equal, round, and reactive to light.   Cardiovascular:      Rate and Rhythm: Normal rate and regular rhythm.      Heart sounds: Normal heart sounds.   Pulmonary:      Effort: Pulmonary effort is normal.      Breath sounds: Normal breath sounds.   Abdominal:      General: Bowel sounds are normal.      Palpations: Abdomen is soft.   Musculoskeletal:         General: Normal range of motion.   Skin:     Findings: No rash.   Neurological:      General: No focal deficit present.      Mental Status: She is alert and oriented to person, place, and time. Mental status is at baseline.   Psychiatric:         Mood and Affect: Mood normal.         Behavior: Behavior normal.         Assessment:     1. Encounter for initial annual wellness visit (AWV) in Medicare patient    2. BMI 25.0-25.9,adult    3. Vitamin D deficiency  - DXA Bone Density Spine And Hip; Future    4. Encounter for screening mammogram for malignant neoplasm of breast  - Mammo Digital Screening Bilat; Future    5. Post hysterectomy menopause  - DXA Bone Density Spine And Hip; Future    6. Screening for viral disease  - Hepatitis C Antibody; Future  - Hepatitis C Antibody    7. Primary hypertension  - amLODIPine (NORVASC) 10 MG tablet; Take 1 tablet (10 mg total) by mouth once daily. For BLOOD PRESSURE  Dispense: 90 tablet; Refill: 1  - carvediloL (COREG) 6.25 MG tablet; Take 1 tablet (6.25 mg total) by mouth 2 (two) times daily. For BLOOD PRESSURE  Dispense: 180 tablet; Refill: 1  - lisinopriL (PRINIVIL,ZESTRIL) 20 MG tablet; Take 1 tablet (20 mg total) by mouth once daily. For BLOOD PRESSURE  Dispense: 90 tablet; Refill: 1    8. Acquired hypothyroidism  - SYNTHROID 88 mcg tablet; Take 1 tablet (88 mcg total) by mouth once daily. For THYROID  Dispense: 90 tablet; Refill: 1         Plan:    Referrals:        Advised to  call office if does not hear from anyone with referral appt within 2-3 weeks to check on status of referral. Voiced understanding.      Discussed and provided with a screening schedule and personal prevention plan in accordance with USPSTF age appropriate recommendations and Medicare screening guidelines.   Education, counseling, and referrals were provided as needed.  After Visit Summary printed and given to patient which includes written education and a list of any referrals if indicated.     Education including advanced directives, diet, exercise, falls, and preventive health discussed with patient and patient verbalized understanding.      F/u plan for yearly AWV.    Signature:  Myles Sweet MD

## 2022-09-02 ENCOUNTER — TELEPHONE (OUTPATIENT)
Dept: FAMILY MEDICINE | Facility: CLINIC | Age: 67
End: 2022-09-02
Payer: MEDICARE

## 2022-11-09 DIAGNOSIS — Z71.89 COMPLEX CARE COORDINATION: ICD-10-CM

## 2022-11-18 ENCOUNTER — OFFICE VISIT (OUTPATIENT)
Dept: FAMILY MEDICINE | Facility: CLINIC | Age: 67
End: 2022-11-18
Payer: MEDICARE

## 2022-11-18 VITALS
SYSTOLIC BLOOD PRESSURE: 132 MMHG | RESPIRATION RATE: 18 BRPM | BODY MASS INDEX: 25.83 KG/M2 | TEMPERATURE: 98 F | OXYGEN SATURATION: 99 % | HEIGHT: 65 IN | DIASTOLIC BLOOD PRESSURE: 79 MMHG | WEIGHT: 155 LBS | HEART RATE: 89 BPM

## 2022-11-18 DIAGNOSIS — N30.00 ACUTE CYSTITIS WITHOUT HEMATURIA: Primary | ICD-10-CM

## 2022-11-18 DIAGNOSIS — R30.0 DYSURIA: ICD-10-CM

## 2022-11-18 LAB
BILIRUB UR QL STRIP: NEGATIVE
CLARITY UR: CLEAR
COLOR UR: COLORLESS
GLUCOSE UR STRIP-MCNC: NORMAL MG/DL
KETONES UR STRIP-SCNC: NEGATIVE MG/DL
LEUKOCYTE ESTERASE UR QL STRIP: NEGATIVE
NITRITE UR QL STRIP: NEGATIVE
PH UR STRIP: 7.5 PH UNITS
PROT UR QL STRIP: NEGATIVE
RBC # UR STRIP: NEGATIVE /UL
SP GR UR STRIP: 1.01
UROBILINOGEN UR STRIP-ACNC: NORMAL MG/DL

## 2022-11-18 PROCEDURE — 4010F PR ACE/ARB THEARPY RXD/TAKEN: ICD-10-PCS | Mod: ,,, | Performed by: FAMILY MEDICINE

## 2022-11-18 PROCEDURE — 3060F PR POS MICROALBUMINURIA RESULT DOCUMENTED/REVIEW: ICD-10-PCS | Mod: ,,, | Performed by: FAMILY MEDICINE

## 2022-11-18 PROCEDURE — 3078F DIAST BP <80 MM HG: CPT | Mod: ,,, | Performed by: FAMILY MEDICINE

## 2022-11-18 PROCEDURE — 81003 URINALYSIS, REFLEX TO URINE CULTURE: ICD-10-PCS | Mod: QW,,, | Performed by: CLINICAL MEDICAL LABORATORY

## 2022-11-18 PROCEDURE — 3066F NEPHROPATHY DOC TX: CPT | Mod: ,,, | Performed by: FAMILY MEDICINE

## 2022-11-18 PROCEDURE — 1159F PR MEDICATION LIST DOCUMENTED IN MEDICAL RECORD: ICD-10-PCS | Mod: ,,, | Performed by: FAMILY MEDICINE

## 2022-11-18 PROCEDURE — 99213 PR OFFICE/OUTPT VISIT, EST, LEVL III, 20-29 MIN: ICD-10-PCS | Mod: ,,, | Performed by: FAMILY MEDICINE

## 2022-11-18 PROCEDURE — 1160F RVW MEDS BY RX/DR IN RCRD: CPT | Mod: ,,, | Performed by: FAMILY MEDICINE

## 2022-11-18 PROCEDURE — 3066F PR DOCUMENTATION OF TREATMENT FOR NEPHROPATHY: ICD-10-PCS | Mod: ,,, | Performed by: FAMILY MEDICINE

## 2022-11-18 PROCEDURE — 3008F BODY MASS INDEX DOCD: CPT | Mod: ,,, | Performed by: FAMILY MEDICINE

## 2022-11-18 PROCEDURE — 4010F ACE/ARB THERAPY RXD/TAKEN: CPT | Mod: ,,, | Performed by: FAMILY MEDICINE

## 2022-11-18 PROCEDURE — 1159F MED LIST DOCD IN RCRD: CPT | Mod: ,,, | Performed by: FAMILY MEDICINE

## 2022-11-18 PROCEDURE — 1160F PR REVIEW ALL MEDS BY PRESCRIBER/CLIN PHARMACIST DOCUMENTED: ICD-10-PCS | Mod: ,,, | Performed by: FAMILY MEDICINE

## 2022-11-18 PROCEDURE — 3060F POS MICROALBUMINURIA REV: CPT | Mod: ,,, | Performed by: FAMILY MEDICINE

## 2022-11-18 PROCEDURE — 3008F PR BODY MASS INDEX (BMI) DOCUMENTED: ICD-10-PCS | Mod: ,,, | Performed by: FAMILY MEDICINE

## 2022-11-18 PROCEDURE — 3075F SYST BP GE 130 - 139MM HG: CPT | Mod: ,,, | Performed by: FAMILY MEDICINE

## 2022-11-18 PROCEDURE — 3078F PR MOST RECENT DIASTOLIC BLOOD PRESSURE < 80 MM HG: ICD-10-PCS | Mod: ,,, | Performed by: FAMILY MEDICINE

## 2022-11-18 PROCEDURE — 3075F PR MOST RECENT SYSTOLIC BLOOD PRESS GE 130-139MM HG: ICD-10-PCS | Mod: ,,, | Performed by: FAMILY MEDICINE

## 2022-11-18 PROCEDURE — 99213 OFFICE O/P EST LOW 20 MIN: CPT | Mod: ,,, | Performed by: FAMILY MEDICINE

## 2022-11-18 PROCEDURE — 81003 URINALYSIS AUTO W/O SCOPE: CPT | Mod: QW,,, | Performed by: CLINICAL MEDICAL LABORATORY

## 2022-11-18 RX ORDER — NITROFURANTOIN 25; 75 MG/1; MG/1
100 CAPSULE ORAL 2 TIMES DAILY
Qty: 14 CAPSULE | Refills: 0 | Status: SHIPPED | OUTPATIENT
Start: 2022-11-18 | End: 2023-02-27

## 2022-11-18 NOTE — PROGRESS NOTES
Myles Sweet MD    [address] [place] [address]     PATIENT NAME: Yoanna Hurtado  : 1955  DATE: 22  MRN: [id number]      Billing Provider: Myles Sweet MD  Level of Service: UT OFFICE/OUTPT VISIT, EST, LEVL III, 20-29 MIN  Patient PCP Information       Provider PCP Type    Myles Sweet MD General            Reason for Visit / Chief Complaint: Urinary Tract Infection       Update PCP  Update Chief Complaint         History of Present Illness / Problem Focused Workflow     Yoanna Hurtado presents to the clinic with Urinary Tract Infection     Urinary Tract Infection   Pertinent negatives include no chills, nausea, vomiting, constipation or rash.     Review of Systems     Review of Systems   Constitutional:  Negative for activity change, appetite change, chills, fatigue and fever.   HENT:  Negative for nasal congestion, ear pain, hearing loss, postnasal drip and sore throat.    Respiratory:  Negative for cough, chest tightness, shortness of breath and wheezing.    Cardiovascular:  Negative for chest pain, palpitations, leg swelling and claudication.   Gastrointestinal:  Negative for abdominal pain, change in bowel habit, constipation, diarrhea, nausea, vomiting and change in bowel habit.   Genitourinary:  Negative for dysuria.   Musculoskeletal:  Negative for arthralgias, back pain, gait problem and myalgias.   Integumentary:  Negative for rash.   Neurological:  Negative for weakness and headaches.   Psychiatric/Behavioral:  Negative for suicidal ideas. The patient is not nervous/anxious.       Medical / Social / Family History     Past Medical History:   Diagnosis Date    Hypertension     Hypothyroidism        Past Surgical History:   Procedure Laterality Date    HYSTERECTOMY      TOTAL THYROIDECTOMY         Social History  Ms.  reports that she has never smoked. She has never used smokeless tobacco. She reports that she does not drink  alcohol and does not use drugs.    Family History  Ms.'s family history is not on file.    Medications and Allergies     Medications  No outpatient medications have been marked as taking for the 11/18/22 encounter (Office Visit) with Myles Sweet MD.       Allergies  Review of patient's allergies indicates:   Allergen Reactions    Penicillins     Sulfa (sulfonamide antibiotics)        Physical Examination     Vitals:    11/18/22 0856   BP: 132/79   Pulse:    Resp:    Temp:      Physical Exam  Vitals and nursing note reviewed.   Constitutional:       General: She is not in acute distress.     Appearance: Normal appearance. She is not ill-appearing.   Eyes:      Extraocular Movements: Extraocular movements intact.      Pupils: Pupils are equal, round, and reactive to light.   Cardiovascular:      Rate and Rhythm: Normal rate and regular rhythm.      Heart sounds: Normal heart sounds.   Pulmonary:      Effort: Pulmonary effort is normal.      Breath sounds: Normal breath sounds.   Abdominal:      General: Bowel sounds are normal.      Palpations: Abdomen is soft.   Musculoskeletal:         General: Normal range of motion.   Skin:     Findings: No rash.   Neurological:      General: No focal deficit present.      Mental Status: She is alert and oriented to person, place, and time. Mental status is at baseline.   Psychiatric:         Mood and Affect: Mood normal.         Behavior: Behavior normal.          Assessment and Plan (including Health Maintenance)      Problem List  Smart Sets  Document Outside HM   :    Plan:         Health Maintenance Due   Topic Date Due    Mammogram  Never done    DEXA Scan  Never done    Influenza Vaccine (1) Never done       Problem List Items Addressed This Visit          Renal/    Acute cystitis without hematuria - Primary    Relevant Medications    nitrofurantoin, macrocrystal-monohydrate, (MACROBID) 100 MG capsule     Other Visit Diagnoses       Dysuria        Relevant Orders     Urinalysis, Reflex to Urine Culture          Acute cystitis without hematuria  -     nitrofurantoin, macrocrystal-monohydrate, (MACROBID) 100 MG capsule; Take 1 capsule (100 mg total) by mouth 2 (two) times daily.  Dispense: 14 capsule; Refill: 0    Dysuria  -     Urinalysis, Reflex to Urine Culture; Future; Expected date: 11/18/2022     Health Maintenance Topics with due status: Not Due       Topic Last Completion Date    Colorectal Cancer Screening 10/05/2015    Lipid Panel 02/25/2022       Procedures     Future Appointments   Date Time Provider Department Center   11/18/2022 10:30 AM Myles Sweet MD [facility]   12/13/2022  1:15 PM [facility]MO2 [facility]   12/13/2022  1[facility]30 [facility]   9/11/2023  8:00 AM [facility] NURSE, [facility] FAMILY MEDICINE [facility] FAM[facility]        Follow up if symptoms worsen or fail to improve.     Signature:  Myles Sweet MD  [address] [place] [address]  Phone #: [phone]  Fax #: [phone]    Date of encounter: 11/18/22    There are no Patient Instructions on file for this visit.

## 2022-12-13 ENCOUNTER — HOSPITAL ENCOUNTER (OUTPATIENT)
Dept: RADIOLOGY | Facility: HOSPITAL | Age: 67
Discharge: HOME | End: 2022-12-13
Attending: FAMILY MEDICINE
Payer: MEDICARE

## 2022-12-13 DIAGNOSIS — Z90.710 POST HYSTERECTOMY MENOPAUSE: ICD-10-CM

## 2022-12-13 DIAGNOSIS — E89.40 POST HYSTERECTOMY MENOPAUSE: ICD-10-CM

## 2022-12-13 DIAGNOSIS — Z12.31 ENCOUNTER FOR SCREENING MAMMOGRAM FOR MALIGNANT NEOPLASM OF BREAST: ICD-10-CM

## 2022-12-13 DIAGNOSIS — E55.9 VITAMIN D DEFICIENCY: ICD-10-CM

## 2022-12-13 PROCEDURE — 77067 SCR MAMMO BI INCL CAD: CPT | Mod: TC

## 2022-12-13 PROCEDURE — 77080 DXA BONE DENSITY AXIAL: CPT | Mod: 26,,, | Performed by: RADIOLOGY

## 2022-12-13 PROCEDURE — 77080 DXA BONE DENSITY AXIAL: CPT | Mod: TC

## 2022-12-13 PROCEDURE — 77080 DEXA BONE DENSITY SPINE HIP: ICD-10-PCS | Mod: 26,,, | Performed by: RADIOLOGY

## 2023-02-14 ENCOUNTER — TELEPHONE (OUTPATIENT)
Dept: FAMILY MEDICINE | Facility: CLINIC | Age: 68
End: 2023-02-14
Payer: MEDICARE

## 2023-02-14 NOTE — TELEPHONE ENCOUNTER
----- Message from Dasha Wray sent at 2/14/2023  2:18 PM CST -----  Regarding: refill  Patient has an appointment on 2/27 but will be out of medication before then and would like to see if she can have some called in to get her by until her appointment. Patient call back number is [phone].

## 2023-02-27 ENCOUNTER — OFFICE VISIT (OUTPATIENT)
Dept: FAMILY MEDICINE | Facility: CLINIC | Age: 68
End: 2023-02-27
Payer: MEDICARE

## 2023-02-27 VITALS
HEART RATE: 75 BPM | OXYGEN SATURATION: 98 % | WEIGHT: 151.19 LBS | HEIGHT: 65 IN | TEMPERATURE: 99 F | SYSTOLIC BLOOD PRESSURE: 132 MMHG | RESPIRATION RATE: 20 BRPM | BODY MASS INDEX: 25.19 KG/M2 | DIASTOLIC BLOOD PRESSURE: 74 MMHG

## 2023-02-27 DIAGNOSIS — I10 PRIMARY HYPERTENSION: Primary | Chronic | ICD-10-CM

## 2023-02-27 DIAGNOSIS — E55.9 VITAMIN D DEFICIENCY: Chronic | ICD-10-CM

## 2023-02-27 DIAGNOSIS — J30.2 SEASONAL ALLERGIES: Chronic | ICD-10-CM

## 2023-02-27 DIAGNOSIS — E03.9 ACQUIRED HYPOTHYROIDISM: Chronic | ICD-10-CM

## 2023-02-27 PROBLEM — N30.00 ACUTE CYSTITIS WITHOUT HEMATURIA: Status: RESOLVED | Noted: 2022-11-18 | Resolved: 2023-02-27

## 2023-02-27 LAB
ANION GAP SERPL CALCULATED.3IONS-SCNC: 5 MMOL/L (ref 7–16)
BASOPHILS # BLD AUTO: 0.01 K/UL (ref 0–0.2)
BASOPHILS NFR BLD AUTO: 0.3 % (ref 0–1)
BILIRUB UR QL STRIP: NEGATIVE
BUN SERPL-MCNC: 12 MG/DL (ref 7–18)
BUN/CREAT SERPL: 16 (ref 6–20)
CALCIUM SERPL-MCNC: 10.2 MG/DL (ref 8.5–10.1)
CHLORIDE SERPL-SCNC: 105 MMOL/L (ref 98–107)
CHOLEST SERPL-MCNC: 195 MG/DL (ref 0–200)
CHOLEST/HDLC SERPL: 2 {RATIO}
CLARITY UR: CLEAR
CO2 SERPL-SCNC: 29 MMOL/L (ref 21–32)
COLOR UR: COLORLESS
CREAT SERPL-MCNC: 0.73 MG/DL (ref 0.55–1.02)
CREAT UR-MCNC: 39 MG/DL (ref 28–219)
DIFFERENTIAL METHOD BLD: ABNORMAL
EGFR (NO RACE VARIABLE) (RUSH/TITUS): 91 ML/MIN/1.73M²
EOSINOPHIL # BLD AUTO: 0.04 K/UL (ref 0–0.5)
EOSINOPHIL NFR BLD AUTO: 1.3 % (ref 1–4)
ERYTHROCYTE [DISTWIDTH] IN BLOOD BY AUTOMATED COUNT: 13.4 % (ref 11.5–14.5)
GLUCOSE SERPL-MCNC: 92 MG/DL (ref 74–106)
GLUCOSE UR STRIP-MCNC: NORMAL MG/DL
HCT VFR BLD AUTO: 38.2 % (ref 38–47)
HDLC SERPL-MCNC: 98 MG/DL (ref 40–60)
HGB BLD-MCNC: 11.9 G/DL (ref 12–16)
IMM GRANULOCYTES # BLD AUTO: 0.01 K/UL (ref 0–0.04)
IMM GRANULOCYTES NFR BLD: 0.3 % (ref 0–0.4)
KETONES UR STRIP-SCNC: NEGATIVE MG/DL
LDLC SERPL CALC-MCNC: 83 MG/DL
LEUKOCYTE ESTERASE UR QL STRIP: NEGATIVE
LYMPHOCYTES # BLD AUTO: 1.55 K/UL (ref 1–4.8)
LYMPHOCYTES NFR BLD AUTO: 50.5 % (ref 27–41)
MCH RBC QN AUTO: 29.9 PG (ref 27–31)
MCHC RBC AUTO-ENTMCNC: 31.2 G/DL (ref 32–36)
MCV RBC AUTO: 96 FL (ref 80–96)
MICROALBUMIN UR-MCNC: 1.2 MG/DL (ref 0–2.8)
MICROALBUMIN/CREAT RATIO PNL UR: 30.8 MG/G (ref 0–30)
MONOCYTES # BLD AUTO: 0.32 K/UL (ref 0–0.8)
MONOCYTES NFR BLD AUTO: 10.4 % (ref 2–6)
MPC BLD CALC-MCNC: 11.6 FL (ref 9.4–12.4)
NEUTROPHILS # BLD AUTO: 1.14 K/UL (ref 1.8–7.7)
NEUTROPHILS NFR BLD AUTO: 37.2 % (ref 53–65)
NITRITE UR QL STRIP: NEGATIVE
NONHDLC SERPL-MCNC: 97 MG/DL
NRBC # BLD AUTO: 0 X10E3/UL
NRBC, AUTO (.00): 0 %
PH UR STRIP: 7.5 PH UNITS
PLATELET # BLD AUTO: 247 K/UL (ref 150–400)
POTASSIUM SERPL-SCNC: 4.1 MMOL/L (ref 3.5–5.1)
PROT UR QL STRIP: NEGATIVE
RBC # BLD AUTO: 3.98 M/UL (ref 4.2–5.4)
RBC # UR STRIP: NEGATIVE /UL
SODIUM SERPL-SCNC: 135 MMOL/L (ref 136–145)
SP GR UR STRIP: 1.01
TRIGL SERPL-MCNC: 70 MG/DL (ref 35–150)
UROBILINOGEN UR STRIP-ACNC: NORMAL MG/DL
VLDLC SERPL-MCNC: 14 MG/DL
WBC # BLD AUTO: 3.07 K/UL (ref 4.5–11)

## 2023-02-27 PROCEDURE — 82570 MICROALBUMIN / CREATININE RATIO URINE: ICD-10-PCS | Mod: ,,, | Performed by: CLINICAL MEDICAL LABORATORY

## 2023-02-27 PROCEDURE — 82570 ASSAY OF URINE CREATININE: CPT | Mod: ,,, | Performed by: CLINICAL MEDICAL LABORATORY

## 2023-02-27 PROCEDURE — 80061 LIPID PANEL: CPT | Mod: ,,, | Performed by: CLINICAL MEDICAL LABORATORY

## 2023-02-27 PROCEDURE — 3008F BODY MASS INDEX DOCD: CPT | Mod: ,,, | Performed by: FAMILY MEDICINE

## 2023-02-27 PROCEDURE — 81003 URINALYSIS AUTO W/O SCOPE: CPT | Mod: QW,,, | Performed by: CLINICAL MEDICAL LABORATORY

## 2023-02-27 PROCEDURE — 1101F PT FALLS ASSESS-DOCD LE1/YR: CPT | Mod: ,,, | Performed by: FAMILY MEDICINE

## 2023-02-27 PROCEDURE — 80048 BASIC METABOLIC PANEL: ICD-10-PCS | Mod: ,,, | Performed by: CLINICAL MEDICAL LABORATORY

## 2023-02-27 PROCEDURE — 85025 CBC WITH DIFFERENTIAL: ICD-10-PCS | Mod: ,,, | Performed by: CLINICAL MEDICAL LABORATORY

## 2023-02-27 PROCEDURE — 4010F ACE/ARB THERAPY RXD/TAKEN: CPT | Mod: ,,, | Performed by: FAMILY MEDICINE

## 2023-02-27 PROCEDURE — 82043 UR ALBUMIN QUANTITATIVE: CPT | Mod: ,,, | Performed by: CLINICAL MEDICAL LABORATORY

## 2023-02-27 PROCEDURE — 3078F DIAST BP <80 MM HG: CPT | Mod: ,,, | Performed by: FAMILY MEDICINE

## 2023-02-27 PROCEDURE — 1101F PR PT FALLS ASSESS DOC 0-1 FALLS W/OUT INJ PAST YR: ICD-10-PCS | Mod: ,,, | Performed by: FAMILY MEDICINE

## 2023-02-27 PROCEDURE — 3008F PR BODY MASS INDEX (BMI) DOCUMENTED: ICD-10-PCS | Mod: ,,, | Performed by: FAMILY MEDICINE

## 2023-02-27 PROCEDURE — 99214 OFFICE O/P EST MOD 30 MIN: CPT | Mod: ,,, | Performed by: FAMILY MEDICINE

## 2023-02-27 PROCEDURE — 85025 COMPLETE CBC W/AUTO DIFF WBC: CPT | Mod: ,,, | Performed by: CLINICAL MEDICAL LABORATORY

## 2023-02-27 PROCEDURE — 1159F MED LIST DOCD IN RCRD: CPT | Mod: ,,, | Performed by: FAMILY MEDICINE

## 2023-02-27 PROCEDURE — 80061 LIPID PANEL: ICD-10-PCS | Mod: ,,, | Performed by: CLINICAL MEDICAL LABORATORY

## 2023-02-27 PROCEDURE — 80048 BASIC METABOLIC PNL TOTAL CA: CPT | Mod: ,,, | Performed by: CLINICAL MEDICAL LABORATORY

## 2023-02-27 PROCEDURE — 3078F PR MOST RECENT DIASTOLIC BLOOD PRESSURE < 80 MM HG: ICD-10-PCS | Mod: ,,, | Performed by: FAMILY MEDICINE

## 2023-02-27 PROCEDURE — 4010F PR ACE/ARB THEARPY RXD/TAKEN: ICD-10-PCS | Mod: ,,, | Performed by: FAMILY MEDICINE

## 2023-02-27 PROCEDURE — 1159F PR MEDICATION LIST DOCUMENTED IN MEDICAL RECORD: ICD-10-PCS | Mod: ,,, | Performed by: FAMILY MEDICINE

## 2023-02-27 PROCEDURE — 82043 MICROALBUMIN / CREATININE RATIO URINE: ICD-10-PCS | Mod: ,,, | Performed by: CLINICAL MEDICAL LABORATORY

## 2023-02-27 PROCEDURE — 3288F FALL RISK ASSESSMENT DOCD: CPT | Mod: ,,, | Performed by: FAMILY MEDICINE

## 2023-02-27 PROCEDURE — 3075F PR MOST RECENT SYSTOLIC BLOOD PRESS GE 130-139MM HG: ICD-10-PCS | Mod: ,,, | Performed by: FAMILY MEDICINE

## 2023-02-27 PROCEDURE — 3288F PR FALLS RISK ASSESSMENT DOCUMENTED: ICD-10-PCS | Mod: ,,, | Performed by: FAMILY MEDICINE

## 2023-02-27 PROCEDURE — 81003 URINALYSIS, REFLEX TO URINE CULTURE: ICD-10-PCS | Mod: QW,,, | Performed by: CLINICAL MEDICAL LABORATORY

## 2023-02-27 PROCEDURE — 3075F SYST BP GE 130 - 139MM HG: CPT | Mod: ,,, | Performed by: FAMILY MEDICINE

## 2023-02-27 PROCEDURE — 99214 PR OFFICE/OUTPT VISIT, EST, LEVL IV, 30-39 MIN: ICD-10-PCS | Mod: ,,, | Performed by: FAMILY MEDICINE

## 2023-02-27 RX ORDER — LISINOPRIL 20 MG/1
20 TABLET ORAL DAILY
Qty: 90 TABLET | Refills: 1 | Status: SHIPPED | OUTPATIENT
Start: 2023-02-27 | End: 2023-08-31 | Stop reason: SDUPTHER

## 2023-02-27 RX ORDER — LEVOTHYROXINE SODIUM 88 UG/1
88 TABLET ORAL DAILY
Qty: 90 TABLET | Refills: 1 | Status: SHIPPED | OUTPATIENT
Start: 2023-02-27 | End: 2023-08-31 | Stop reason: SDUPTHER

## 2023-02-27 RX ORDER — FLUTICASONE PROPIONATE 50 MCG
1 SPRAY, SUSPENSION (ML) NASAL DAILY
Qty: 16 G | Refills: 5 | Status: SHIPPED | OUTPATIENT
Start: 2023-02-27 | End: 2023-09-18 | Stop reason: SDUPTHER

## 2023-02-27 RX ORDER — CARVEDILOL 6.25 MG/1
6.25 TABLET ORAL 2 TIMES DAILY
Qty: 180 TABLET | Refills: 1 | Status: SHIPPED | OUTPATIENT
Start: 2023-02-27 | End: 2023-08-31 | Stop reason: SDUPTHER

## 2023-02-27 RX ORDER — AMLODIPINE BESYLATE 10 MG/1
10 TABLET ORAL DAILY
Qty: 90 TABLET | Refills: 1 | Status: SHIPPED | OUTPATIENT
Start: 2023-02-27 | End: 2023-08-31 | Stop reason: SDUPTHER

## 2023-02-27 NOTE — ASSESSMENT & PLAN NOTE
Blood pressure has been stable for many years. No change in treatment. Continue to eat healthy and stay active.      - Goal blood pressure for most patients is less than 130/85. Both numbers are important. If you have questions about your specific goal blood pressure, please ask at your clinic visits.   - Check blood pressure outside of clinic, record the numbers, and bring the log to all your office visits.   - If you have any chest pain, SOB, or other concerning symptoms, report these immediately, or go to the nearest ER.    - Recommend cardiovascular exercise, at least 3 times per week, for at least 15 minutes.   - Eat a heart healthy diet.

## 2023-02-27 NOTE — ASSESSMENT & PLAN NOTE
Chronically well controlled. We will monitor her levels over time and adjust medications as needed

## 2023-02-27 NOTE — PROGRESS NOTES
Myles Sweet MD    [address] [place] [address]     PATIENT NAME: Yoanna Hurtado  : 1955  DATE: 23  MRN: [id number]      Billing Provider: Myles Sweet MD  Level of Service: NM OFFICE/OUTPT VISIT, EST, LEVL IV, 30-39 MIN  Patient PCP Information       Provider PCP Type    Myles Sweet MD General            Reason for Visit / Chief Complaint: Follow-up (Here today for check up and needs medication refill. Denies any other complaint. Patient refused shingle vaccine, influenza vaccine, and Pneumococcal vaccine. /)       Update PCP  Update Chief Complaint         History of Present Illness / Problem Focused Workflow     Yoanna Hurtado presents to the clinic with Follow-up (Here today for check up and needs medication refill. Denies any other complaint. Patient refused shingle vaccine, influenza vaccine, and Pneumococcal vaccine. /)     HPI    Review of Systems     Review of Systems   Constitutional:  Negative for activity change, appetite change, chills, fatigue and fever.   HENT:  Negative for nasal congestion, ear pain, hearing loss, postnasal drip and sore throat.    Respiratory:  Negative for cough, chest tightness, shortness of breath and wheezing.    Cardiovascular:  Negative for chest pain, palpitations, leg swelling and claudication.   Gastrointestinal:  Negative for abdominal pain, change in bowel habit, constipation, diarrhea, nausea, vomiting and change in bowel habit.   Genitourinary:  Negative for dysuria.   Musculoskeletal:  Negative for arthralgias, back pain, gait problem and myalgias.   Integumentary:  Negative for rash.   Neurological:  Negative for weakness and headaches.   Psychiatric/Behavioral:  Negative for suicidal ideas. The patient is not nervous/anxious.       Medical / Social / Family History     Past Medical History:   Diagnosis Date    Hypertension     Hypothyroidism        Past Surgical History:   Procedure  Laterality Date    HYSTERECTOMY      OOPHORECTOMY      TOTAL THYROIDECTOMY         Social History  Ms.  reports that she has never smoked. She has never been exposed to tobacco smoke. She has never used smokeless tobacco. She reports that she does not drink alcohol and does not use drugs.    Family History  MsCarson's family history is not on file.    Medications and Allergies     Medications  Outpatient Medications Marked as Taking for the 2/27/23 encounter (Office Visit) with Myles Sweet MD   Medication Sig Dispense Refill    calcium-vitamin D3 (OS-HORTENCIA 500 + D3) 500 mg-5 mcg (200 unit) per tablet Take 1 tablet by mouth 2 (two) times daily with meals.      cholecalciferol, vitamin D3, 125 mcg (5,000 unit) Tab Take 5,000 Units by mouth once daily.      magnesium 250 mg Tab Take by mouth once.      multivitamin/iron/folic acid (CENTRUM WOMEN ORAL) Take by mouth.      potassium chloride (KLOR-CON) 10 MEQ TbSR Take 1 tablet (10 mEq total) by mouth once daily. 90 tablet 1    [DISCONTINUED] amLODIPine (NORVASC) 10 MG tablet Take 1 tablet (10 mg total) by mouth once daily. For BLOOD PRESSURE 90 tablet 1    [DISCONTINUED] carvediloL (COREG) 6.25 MG tablet Take 1 tablet (6.25 mg total) by mouth 2 (two) times daily. For BLOOD PRESSURE 180 tablet 1    [DISCONTINUED] fluticasone propionate (FLONASE) 50 mcg/actuation nasal spray 1 spray by Each Nostril route once daily.      [DISCONTINUED] lisinopriL (PRINIVIL,ZESTRIL) 20 MG tablet Take 1 tablet (20 mg total) by mouth once daily. For BLOOD PRESSURE 90 tablet 1    [DISCONTINUED] SYNTHROID 88 mcg tablet Take 1 tablet (88 mcg total) by mouth once daily. For THYROID 90 tablet 1       Allergies  Review of patient's allergies indicates:   Allergen Reactions    Penicillins     Sulfa (sulfonamide antibiotics)        Physical Examination     Vitals:    02/27/23 0839   BP: 132/74   Pulse: 75   Resp: 20   Temp: 98.7 °F (37.1 °C)     Physical Exam  Vitals and nursing note reviewed.    Constitutional:       General: She is not in acute distress.     Appearance: Normal appearance. She is not ill-appearing.   Eyes:      Extraocular Movements: Extraocular movements intact.      Pupils: Pupils are equal, round, and reactive to light.   Cardiovascular:      Rate and Rhythm: Normal rate and regular rhythm.      Heart sounds: Normal heart sounds.   Pulmonary:      Effort: Pulmonary effort is normal.      Breath sounds: Normal breath sounds.   Abdominal:      General: Bowel sounds are normal.      Palpations: Abdomen is soft.   Musculoskeletal:         General: Normal range of motion.   Skin:     Findings: No rash.   Neurological:      General: No focal deficit present.      Mental Status: She is alert and oriented to person, place, and time. Mental status is at baseline.   Psychiatric:         Mood and Affect: Mood normal.         Behavior: Behavior normal.          Assessment and Plan (including Health Maintenance)      Problem List  Smart Protek-dor  Document Outside HM   :    Plan:         Health Maintenance Due   Topic Date Due    Hemoglobin A1c (Diabetic Prevention Screening)  Never done    Lipid Panel  02/25/2023       Problem List Items Addressed This Visit          ENT    Seasonal allergies (Chronic)    Current Assessment & Plan     It is almost spring, but her symptoms are well controlled at this time. She will use an antihistamine and flonase if needed for allergic rhinitis and allergies.          Relevant Medications    fluticasone propionate (FLONASE) 50 mcg/actuation nasal spray       Cardiac/Vascular    Hypertension - Primary (Chronic)    Current Assessment & Plan     Blood pressure has been stable for many years. No change in treatment. Continue to eat healthy and stay active.      - Goal blood pressure for most patients is less than 130/85. Both numbers are important. If you have questions about your specific goal blood pressure, please ask at your clinic visits.   - Check blood pressure  outside of clinic, record the numbers, and bring the log to all your office visits.   - If you have any chest pain, SOB, or other concerning symptoms, report these immediately, or go to the nearest ER.    - Recommend cardiovascular exercise, at least 3 times per week, for at least 15 minutes.   - Eat a heart healthy diet.            Relevant Medications    carvediloL (COREG) 6.25 MG tablet    lisinopriL (PRINIVIL,ZESTRIL) 20 MG tablet    amLODIPine (NORVASC) 10 MG tablet    Other Relevant Orders    Basic Metabolic Panel    CBC Auto Differential    Lipid Panel    Microalbumin/Creatinine Ratio, Urine    Urinalysis, Reflex to Urine Culture       Endocrine    Hypothyroidism (Chronic)    Current Assessment & Plan     Chronically well controlled. We will monitor her levels over time and adjust medications as needed         Relevant Medications    SYNTHROID 88 mcg tablet    Vitamin D deficiency (Chronic)    Current Assessment & Plan     Continue the OTC Vit D for now. Labs drawn today for the level.            Relevant Orders    Vitamin D       Health Maintenance Topics with due status: Not Due       Topic Last Completion Date    Colorectal Cancer Screening 10/05/2015    DEXA Scan 12/13/2022    Mammogram 12/19/2022       Procedures     Future Appointments   Date Time Provider Department Center   9/11/2023  8:00 AM [facility] NURSE, [facility] FAMILY MEDIC[facility] K[facility]LEIGH [facility]ales   12/19/2023  8:00 AM [facility] [place] [facility]        Follow up in about 6 months (around 8/27/2023) for chronic health problems, hypertension.     Signature:  Myles Sweet MD  [address] [place] [address]  Phone #: [phone]  Fax #: [phone]    Date of encounter: 2/27/23    There are no Patient Instructions on file for this visit.

## 2023-02-27 NOTE — ASSESSMENT & PLAN NOTE
It is almost spring, but her symptoms are well controlled at this time. She will use an antihistamine and flonase if needed for allergic rhinitis and allergies.

## 2023-04-14 ENCOUNTER — OFFICE VISIT (OUTPATIENT)
Dept: FAMILY MEDICINE | Facility: CLINIC | Age: 68
End: 2023-04-14
Payer: MEDICARE

## 2023-04-14 VITALS
OXYGEN SATURATION: 98 % | SYSTOLIC BLOOD PRESSURE: 138 MMHG | RESPIRATION RATE: 16 BRPM | BODY MASS INDEX: 25.19 KG/M2 | DIASTOLIC BLOOD PRESSURE: 78 MMHG | HEART RATE: 70 BPM | TEMPERATURE: 98 F | HEIGHT: 65 IN | WEIGHT: 151.19 LBS

## 2023-04-14 DIAGNOSIS — R30.0 DYSURIA: Primary | ICD-10-CM

## 2023-04-14 PROCEDURE — 99213 PR OFFICE/OUTPT VISIT, EST, LEVL III, 20-29 MIN: ICD-10-PCS | Mod: ,,, | Performed by: FAMILY MEDICINE

## 2023-04-14 PROCEDURE — 3008F BODY MASS INDEX DOCD: CPT | Mod: ,,, | Performed by: FAMILY MEDICINE

## 2023-04-14 PROCEDURE — 3066F PR DOCUMENTATION OF TREATMENT FOR NEPHROPATHY: ICD-10-PCS | Mod: ,,, | Performed by: FAMILY MEDICINE

## 2023-04-14 PROCEDURE — 3060F POS MICROALBUMINURIA REV: CPT | Mod: ,,, | Performed by: FAMILY MEDICINE

## 2023-04-14 PROCEDURE — 4010F PR ACE/ARB THEARPY RXD/TAKEN: ICD-10-PCS | Mod: ,,, | Performed by: FAMILY MEDICINE

## 2023-04-14 PROCEDURE — 1159F PR MEDICATION LIST DOCUMENTED IN MEDICAL RECORD: ICD-10-PCS | Mod: ,,, | Performed by: FAMILY MEDICINE

## 2023-04-14 PROCEDURE — 3078F PR MOST RECENT DIASTOLIC BLOOD PRESSURE < 80 MM HG: ICD-10-PCS | Mod: ,,, | Performed by: FAMILY MEDICINE

## 2023-04-14 PROCEDURE — 1160F RVW MEDS BY RX/DR IN RCRD: CPT | Mod: ,,, | Performed by: FAMILY MEDICINE

## 2023-04-14 PROCEDURE — 99213 OFFICE O/P EST LOW 20 MIN: CPT | Mod: ,,, | Performed by: FAMILY MEDICINE

## 2023-04-14 PROCEDURE — 3075F SYST BP GE 130 - 139MM HG: CPT | Mod: ,,, | Performed by: FAMILY MEDICINE

## 2023-04-14 PROCEDURE — 3078F DIAST BP <80 MM HG: CPT | Mod: ,,, | Performed by: FAMILY MEDICINE

## 2023-04-14 PROCEDURE — 1160F PR REVIEW ALL MEDS BY PRESCRIBER/CLIN PHARMACIST DOCUMENTED: ICD-10-PCS | Mod: ,,, | Performed by: FAMILY MEDICINE

## 2023-04-14 PROCEDURE — 3060F PR POS MICROALBUMINURIA RESULT DOCUMENTED/REVIEW: ICD-10-PCS | Mod: ,,, | Performed by: FAMILY MEDICINE

## 2023-04-14 PROCEDURE — 3288F PR FALLS RISK ASSESSMENT DOCUMENTED: ICD-10-PCS | Mod: ,,, | Performed by: FAMILY MEDICINE

## 2023-04-14 PROCEDURE — 1159F MED LIST DOCD IN RCRD: CPT | Mod: ,,, | Performed by: FAMILY MEDICINE

## 2023-04-14 PROCEDURE — 3288F FALL RISK ASSESSMENT DOCD: CPT | Mod: ,,, | Performed by: FAMILY MEDICINE

## 2023-04-14 PROCEDURE — 1101F PT FALLS ASSESS-DOCD LE1/YR: CPT | Mod: ,,, | Performed by: FAMILY MEDICINE

## 2023-04-14 PROCEDURE — 3075F PR MOST RECENT SYSTOLIC BLOOD PRESS GE 130-139MM HG: ICD-10-PCS | Mod: ,,, | Performed by: FAMILY MEDICINE

## 2023-04-14 PROCEDURE — 1101F PR PT FALLS ASSESS DOC 0-1 FALLS W/OUT INJ PAST YR: ICD-10-PCS | Mod: ,,, | Performed by: FAMILY MEDICINE

## 2023-04-14 PROCEDURE — 3008F PR BODY MASS INDEX (BMI) DOCUMENTED: ICD-10-PCS | Mod: ,,, | Performed by: FAMILY MEDICINE

## 2023-04-14 PROCEDURE — 3066F NEPHROPATHY DOC TX: CPT | Mod: ,,, | Performed by: FAMILY MEDICINE

## 2023-04-14 PROCEDURE — 4010F ACE/ARB THERAPY RXD/TAKEN: CPT | Mod: ,,, | Performed by: FAMILY MEDICINE

## 2023-04-14 RX ORDER — PUMPKIN SEED EXTRACT/SOY GERM 300 MG
1 CAPSULE ORAL 4 TIMES DAILY PRN
Qty: 30 TABLET | Refills: 2 | Status: SHIPPED | OUTPATIENT
Start: 2023-04-14

## 2023-04-14 NOTE — PROGRESS NOTES
Myles Sweet MD    [address] [place] [address]     PATIENT NAME: Yoanna Hurtado  : 1955  DATE: 23  MRN: [id number]      Billing Provider: Myles Sweet MD  Level of Service: ME OFFICE/OUTPT VISIT, EST, LEVL III, 20-29 MIN  Patient PCP Information       Provider PCP Type    Myles Sweet MD General            Reason for Visit / Chief Complaint: Urinary Tract Infection (Dysuria, frequent urination, lower back pain x 2 days)       Update PCP  Update Chief Complaint         History of Present Illness / Problem Focused Workflow     Yoanna Hurtado presents to the clinic with Urinary Tract Infection (Dysuria, frequent urination, lower back pain x 2 days)     Her lower back started hurting a couple days ago. Dysuria and frequency are only mild symptoms. Back is mild-moderate at times. No fever     HPI    Review of Systems     Review of Systems   Constitutional:  Negative for activity change, appetite change, chills, fatigue and fever.   HENT:  Negative for nasal congestion, ear pain, hearing loss, postnasal drip and sore throat.    Respiratory:  Negative for cough, chest tightness, shortness of breath and wheezing.    Cardiovascular:  Negative for chest pain, palpitations, leg swelling and claudication.   Gastrointestinal:  Negative for abdominal pain, change in bowel habit, constipation, diarrhea, nausea, vomiting and change in bowel habit.   Genitourinary:  Negative for dysuria.   Musculoskeletal:  Negative for arthralgias, back pain, gait problem and myalgias.   Integumentary:  Negative for rash.   Neurological:  Negative for weakness and headaches.   Psychiatric/Behavioral:  Negative for suicidal ideas. The patient is not nervous/anxious.       Medical / Social / Family History     Past Medical History:   Diagnosis Date    Hypertension     Hypothyroidism        Past Surgical History:   Procedure Laterality Date    HYSTERECTOMY       OOPHORECTOMY      TOTAL THYROIDECTOMY         Social History  Ms.  reports that she has never smoked. She has never been exposed to tobacco smoke. She has never used smokeless tobacco. She reports that she does not drink alcohol and does not use drugs.    Family History  Ms.'s family history is not on file.    Medications and Allergies     Medications  Outpatient Medications Marked as Taking for the 4/14/23 encounter (Office Visit) with Myles Sweet MD   Medication Sig Dispense Refill    amLODIPine (NORVASC) 10 MG tablet Take 1 tablet (10 mg total) by mouth once daily. For BLOOD PRESSURE 90 tablet 1    calcium-vitamin D3 (OS-HORTENCIA 500 + D3) 500 mg-5 mcg (200 unit) per tablet Take 1 tablet by mouth 2 (two) times daily with meals.      carvediloL (COREG) 6.25 MG tablet Take 1 tablet (6.25 mg total) by mouth 2 (two) times daily. For BLOOD PRESSURE 180 tablet 1    fluticasone propionate (FLONASE) 50 mcg/actuation nasal spray 1 spray (50 mcg total) by Each Nostril route once daily. 16 g 5    lisinopriL (PRINIVIL,ZESTRIL) 20 MG tablet Take 1 tablet (20 mg total) by mouth once daily. For BLOOD PRESSURE 90 tablet 1    magnesium 250 mg Tab Take by mouth once.      potassium chloride (KLOR-CON) 10 MEQ TbSR Take 1 tablet (10 mEq total) by mouth once daily. 90 tablet 1    SYNTHROID 88 mcg tablet Take 1 tablet (88 mcg total) by mouth once daily. For THYROID 90 tablet 1       Allergies  Review of patient's allergies indicates:   Allergen Reactions    Penicillins     Sulfa (sulfonamide antibiotics)        Physical Examination     Vitals:    04/14/23 1605   BP: 138/78   Pulse: 70   Resp: 16   Temp: 97.8 °F (36.6 °C)     Physical Exam  Vitals and nursing note reviewed.   Constitutional:       General: She is not in acute distress.     Appearance: Normal appearance. She is not ill-appearing.   Eyes:      Extraocular Movements: Extraocular movements intact.      Pupils: Pupils are equal, round, and reactive to light.    Cardiovascular:      Rate and Rhythm: Normal rate and regular rhythm.      Heart sounds: Normal heart sounds.   Pulmonary:      Effort: Pulmonary effort is normal.      Breath sounds: Normal breath sounds.   Abdominal:      General: Bowel sounds are normal.      Palpations: Abdomen is soft.   Musculoskeletal:         General: Normal range of motion.   Skin:     Findings: No rash.   Neurological:      General: No focal deficit present.      Mental Status: She is alert and oriented to person, place, and time. Mental status is at baseline.   Psychiatric:         Mood and Affect: Mood normal.         Behavior: Behavior normal.          Assessment and Plan (including Health Maintenance)      Problem List  Smart Sets  Document Outside HM   :    Plan:         Health Maintenance Due   Topic Date Due    Hemoglobin A1c (Diabetic Prevention Screening)  Never done       Problem List Items Addressed This Visit    None  Visit Diagnoses       Dysuria    -  Primary    Relevant Medications    cranberry fruit concentrate (AZO CRANBERRY) 250 mg Chew    Other Relevant Orders    Urinalysis, Reflex to Urine Culture            Health Maintenance Topics with due status: Not Due       Topic Last Completion Date    Colorectal Cancer Screening 10/05/2015    DEXA Scan 12/13/2022    Mammogram 12/19/2022    Lipid Panel 02/27/2023       Procedures     Future Appointments   Date Time Provider Department Center   9/11/2023  8:00 AM [facility] NURSE, [facility] FAMILY MEDIC[facility]LE[facility] [place]   12/19/2023  8:00 AM [facility]        No follow-ups on file.     Signature:  Myles Sweet MD  [address] [place] [address]  Phone #: [phone]  Fax #: [phone]    Date of encounter: 4/14/23    There are no Patient Instructions on file for this visit.

## 2023-06-09 DIAGNOSIS — Z71.89 COMPLEX CARE COORDINATION: ICD-10-CM

## 2023-08-31 DIAGNOSIS — E03.9 ACQUIRED HYPOTHYROIDISM: Chronic | ICD-10-CM

## 2023-08-31 DIAGNOSIS — I10 PRIMARY HYPERTENSION: Chronic | ICD-10-CM

## 2023-08-31 RX ORDER — CARVEDILOL 6.25 MG/1
6.25 TABLET ORAL 2 TIMES DAILY
Qty: 60 TABLET | Refills: 0 | Status: SHIPPED | OUTPATIENT
Start: 2023-08-31 | End: 2023-09-18 | Stop reason: SDUPTHER

## 2023-08-31 RX ORDER — AMLODIPINE BESYLATE 10 MG/1
10 TABLET ORAL DAILY
Qty: 30 TABLET | Refills: 0 | Status: SHIPPED | OUTPATIENT
Start: 2023-08-31 | End: 2023-09-18 | Stop reason: SDUPTHER

## 2023-08-31 RX ORDER — LEVOTHYROXINE SODIUM 88 UG/1
88 TABLET ORAL DAILY
Qty: 30 TABLET | Refills: 0 | Status: SHIPPED | OUTPATIENT
Start: 2023-08-31 | End: 2023-09-18 | Stop reason: SDUPTHER

## 2023-08-31 RX ORDER — LISINOPRIL 20 MG/1
20 TABLET ORAL DAILY
Qty: 30 TABLET | Refills: 0 | Status: SHIPPED | OUTPATIENT
Start: 2023-08-31 | End: 2023-09-18 | Stop reason: SDUPTHER

## 2023-08-31 RX ORDER — POTASSIUM CHLORIDE 750 MG/1
10 TABLET, EXTENDED RELEASE ORAL DAILY
Qty: 30 TABLET | Refills: 0 | Status: SHIPPED | OUTPATIENT
Start: 2023-08-31 | End: 2023-09-18 | Stop reason: SDUPTHER

## 2023-08-31 NOTE — TELEPHONE ENCOUNTER
----- Message from Alyssa Mandujano sent at 8/31/2023 10:36 AM CDT -----  Pt is needing a refill on all active meds sent to [facility]. She has an ppt on 09/18

## 2023-09-18 ENCOUNTER — OFFICE VISIT (OUTPATIENT)
Dept: FAMILY MEDICINE | Facility: CLINIC | Age: 68
End: 2023-09-18
Payer: MEDICARE

## 2023-09-18 VITALS
TEMPERATURE: 98 F | DIASTOLIC BLOOD PRESSURE: 77 MMHG | SYSTOLIC BLOOD PRESSURE: 138 MMHG | RESPIRATION RATE: 20 BRPM | HEART RATE: 80 BPM | BODY MASS INDEX: 24.66 KG/M2 | HEIGHT: 65 IN | OXYGEN SATURATION: 99 % | WEIGHT: 148 LBS

## 2023-09-18 DIAGNOSIS — E03.9 ACQUIRED HYPOTHYROIDISM: Chronic | ICD-10-CM

## 2023-09-18 DIAGNOSIS — I10 PRIMARY HYPERTENSION: Primary | ICD-10-CM

## 2023-09-18 DIAGNOSIS — E55.9 VITAMIN D DEFICIENCY: Chronic | ICD-10-CM

## 2023-09-18 DIAGNOSIS — M79.672 LEFT FOOT PAIN: ICD-10-CM

## 2023-09-18 DIAGNOSIS — J30.2 SEASONAL ALLERGIES: Chronic | ICD-10-CM

## 2023-09-18 LAB
ANION GAP SERPL CALCULATED.3IONS-SCNC: 7 MMOL/L (ref 7–16)
BASOPHILS # BLD AUTO: 0.01 K/UL (ref 0–0.2)
BASOPHILS NFR BLD AUTO: 0.3 % (ref 0–1)
BILIRUB UR QL STRIP: NEGATIVE
BUN SERPL-MCNC: 13 MG/DL (ref 7–18)
BUN/CREAT SERPL: 17 (ref 6–20)
CALCIUM SERPL-MCNC: 10.1 MG/DL (ref 8.5–10.1)
CHLORIDE SERPL-SCNC: 105 MMOL/L (ref 98–107)
CHOLEST SERPL-MCNC: 191 MG/DL (ref 0–200)
CHOLEST/HDLC SERPL: 2 {RATIO}
CLARITY UR: CLEAR
CO2 SERPL-SCNC: 30 MMOL/L (ref 21–32)
COLOR UR: COLORLESS
CREAT SERPL-MCNC: 0.76 MG/DL (ref 0.55–1.02)
DIFFERENTIAL METHOD BLD: ABNORMAL
EGFR (NO RACE VARIABLE) (RUSH/TITUS): 86 ML/MIN/1.73M2
EOSINOPHIL # BLD AUTO: 0.03 K/UL (ref 0–0.5)
EOSINOPHIL NFR BLD AUTO: 0.9 % (ref 1–4)
ERYTHROCYTE [DISTWIDTH] IN BLOOD BY AUTOMATED COUNT: 13.2 % (ref 11.5–14.5)
GLUCOSE SERPL-MCNC: 97 MG/DL (ref 74–106)
GLUCOSE UR STRIP-MCNC: NORMAL MG/DL
HCT VFR BLD AUTO: 38.5 % (ref 38–47)
HDLC SERPL-MCNC: 97 MG/DL (ref 40–60)
HGB BLD-MCNC: 12.3 G/DL (ref 12–16)
IMM GRANULOCYTES # BLD AUTO: 0.04 K/UL (ref 0–0.04)
IMM GRANULOCYTES NFR BLD: 1.3 % (ref 0–0.4)
KETONES UR STRIP-SCNC: NEGATIVE MG/DL
LDLC SERPL CALC-MCNC: 86 MG/DL
LEUKOCYTE ESTERASE UR QL STRIP: NEGATIVE
LYMPHOCYTES # BLD AUTO: 1.57 K/UL (ref 1–4.8)
LYMPHOCYTES NFR BLD AUTO: 49.7 % (ref 27–41)
MCH RBC QN AUTO: 30.1 PG (ref 27–31)
MCHC RBC AUTO-ENTMCNC: 31.9 G/DL (ref 32–36)
MCV RBC AUTO: 94.1 FL (ref 80–96)
MONOCYTES # BLD AUTO: 0.37 K/UL (ref 0–0.8)
MONOCYTES NFR BLD AUTO: 11.7 % (ref 2–6)
MPC BLD CALC-MCNC: 12.2 FL (ref 9.4–12.4)
NEUTROPHILS # BLD AUTO: 1.14 K/UL (ref 1.8–7.7)
NEUTROPHILS NFR BLD AUTO: 36.1 % (ref 53–65)
NITRITE UR QL STRIP: NEGATIVE
NONHDLC SERPL-MCNC: 94 MG/DL
NRBC # BLD AUTO: 0 X10E3/UL
NRBC, AUTO (.00): 0 %
PH UR STRIP: 7.5 PH UNITS
PLATELET # BLD AUTO: 221 K/UL (ref 150–400)
POTASSIUM SERPL-SCNC: 4.1 MMOL/L (ref 3.5–5.1)
PROT UR QL STRIP: NEGATIVE
RBC # BLD AUTO: 4.09 M/UL (ref 4.2–5.4)
RBC # UR STRIP: NEGATIVE /UL
SODIUM SERPL-SCNC: 138 MMOL/L (ref 136–145)
SP GR UR STRIP: 1.01
T4 FREE SERPL-MCNC: 1.48 NG/DL (ref 0.76–1.46)
TRIGL SERPL-MCNC: 42 MG/DL (ref 35–150)
TSH SERPL DL<=0.005 MIU/L-ACNC: 0.39 UIU/ML (ref 0.36–3.74)
UROBILINOGEN UR STRIP-ACNC: NORMAL MG/DL
VLDLC SERPL-MCNC: 8 MG/DL
WBC # BLD AUTO: 3.16 K/UL (ref 4.5–11)

## 2023-09-18 PROCEDURE — 84439 ASSAY OF FREE THYROXINE: CPT | Mod: ,,, | Performed by: CLINICAL MEDICAL LABORATORY

## 2023-09-18 PROCEDURE — 3008F BODY MASS INDEX DOCD: CPT | Mod: ,,, | Performed by: FAMILY MEDICINE

## 2023-09-18 PROCEDURE — 1159F PR MEDICATION LIST DOCUMENTED IN MEDICAL RECORD: ICD-10-PCS | Mod: ,,, | Performed by: FAMILY MEDICINE

## 2023-09-18 PROCEDURE — 99214 PR OFFICE/OUTPT VISIT, EST, LEVL IV, 30-39 MIN: ICD-10-PCS | Mod: ,,, | Performed by: FAMILY MEDICINE

## 2023-09-18 PROCEDURE — 3078F PR MOST RECENT DIASTOLIC BLOOD PRESSURE < 80 MM HG: ICD-10-PCS | Mod: ,,, | Performed by: FAMILY MEDICINE

## 2023-09-18 PROCEDURE — 1101F PR PT FALLS ASSESS DOC 0-1 FALLS W/OUT INJ PAST YR: ICD-10-PCS | Mod: ,,, | Performed by: FAMILY MEDICINE

## 2023-09-18 PROCEDURE — 3075F PR MOST RECENT SYSTOLIC BLOOD PRESS GE 130-139MM HG: ICD-10-PCS | Mod: ,,, | Performed by: FAMILY MEDICINE

## 2023-09-18 PROCEDURE — 82570 MICROALBUMIN / CREATININE RATIO URINE: ICD-10-PCS | Mod: ,,, | Performed by: CLINICAL MEDICAL LABORATORY

## 2023-09-18 PROCEDURE — 1160F PR REVIEW ALL MEDS BY PRESCRIBER/CLIN PHARMACIST DOCUMENTED: ICD-10-PCS | Mod: ,,, | Performed by: FAMILY MEDICINE

## 2023-09-18 PROCEDURE — 1101F PT FALLS ASSESS-DOCD LE1/YR: CPT | Mod: ,,, | Performed by: FAMILY MEDICINE

## 2023-09-18 PROCEDURE — 85025 COMPLETE CBC W/AUTO DIFF WBC: CPT | Mod: ,,, | Performed by: CLINICAL MEDICAL LABORATORY

## 2023-09-18 PROCEDURE — 99214 OFFICE O/P EST MOD 30 MIN: CPT | Mod: ,,, | Performed by: FAMILY MEDICINE

## 2023-09-18 PROCEDURE — 1159F MED LIST DOCD IN RCRD: CPT | Mod: ,,, | Performed by: FAMILY MEDICINE

## 2023-09-18 PROCEDURE — 3060F POS MICROALBUMINURIA REV: CPT | Mod: ,,, | Performed by: FAMILY MEDICINE

## 2023-09-18 PROCEDURE — 1160F RVW MEDS BY RX/DR IN RCRD: CPT | Mod: ,,, | Performed by: FAMILY MEDICINE

## 2023-09-18 PROCEDURE — 82043 UR ALBUMIN QUANTITATIVE: CPT | Mod: ,,, | Performed by: CLINICAL MEDICAL LABORATORY

## 2023-09-18 PROCEDURE — 3060F PR POS MICROALBUMINURIA RESULT DOCUMENTED/REVIEW: ICD-10-PCS | Mod: ,,, | Performed by: FAMILY MEDICINE

## 2023-09-18 PROCEDURE — 84439 T4, FREE: ICD-10-PCS | Mod: ,,, | Performed by: CLINICAL MEDICAL LABORATORY

## 2023-09-18 PROCEDURE — 85025 CBC WITH DIFFERENTIAL: ICD-10-PCS | Mod: ,,, | Performed by: CLINICAL MEDICAL LABORATORY

## 2023-09-18 PROCEDURE — 4010F ACE/ARB THERAPY RXD/TAKEN: CPT | Mod: ,,, | Performed by: FAMILY MEDICINE

## 2023-09-18 PROCEDURE — 3288F PR FALLS RISK ASSESSMENT DOCUMENTED: ICD-10-PCS | Mod: ,,, | Performed by: FAMILY MEDICINE

## 2023-09-18 PROCEDURE — 84443 TSH: ICD-10-PCS | Mod: ,,, | Performed by: CLINICAL MEDICAL LABORATORY

## 2023-09-18 PROCEDURE — 80048 BASIC METABOLIC PNL TOTAL CA: CPT | Mod: ,,, | Performed by: CLINICAL MEDICAL LABORATORY

## 2023-09-18 PROCEDURE — 84443 ASSAY THYROID STIM HORMONE: CPT | Mod: ,,, | Performed by: CLINICAL MEDICAL LABORATORY

## 2023-09-18 PROCEDURE — 3288F FALL RISK ASSESSMENT DOCD: CPT | Mod: ,,, | Performed by: FAMILY MEDICINE

## 2023-09-18 PROCEDURE — 82570 ASSAY OF URINE CREATININE: CPT | Mod: ,,, | Performed by: CLINICAL MEDICAL LABORATORY

## 2023-09-18 PROCEDURE — 3008F PR BODY MASS INDEX (BMI) DOCUMENTED: ICD-10-PCS | Mod: ,,, | Performed by: FAMILY MEDICINE

## 2023-09-18 PROCEDURE — 4010F PR ACE/ARB THEARPY RXD/TAKEN: ICD-10-PCS | Mod: ,,, | Performed by: FAMILY MEDICINE

## 2023-09-18 PROCEDURE — 3078F DIAST BP <80 MM HG: CPT | Mod: ,,, | Performed by: FAMILY MEDICINE

## 2023-09-18 PROCEDURE — 82043 MICROALBUMIN / CREATININE RATIO URINE: ICD-10-PCS | Mod: ,,, | Performed by: CLINICAL MEDICAL LABORATORY

## 2023-09-18 PROCEDURE — 3066F PR DOCUMENTATION OF TREATMENT FOR NEPHROPATHY: ICD-10-PCS | Mod: ,,, | Performed by: FAMILY MEDICINE

## 2023-09-18 PROCEDURE — 80048 BASIC METABOLIC PANEL: ICD-10-PCS | Mod: ,,, | Performed by: CLINICAL MEDICAL LABORATORY

## 2023-09-18 PROCEDURE — 80061 LIPID PANEL: ICD-10-PCS | Mod: ,,, | Performed by: CLINICAL MEDICAL LABORATORY

## 2023-09-18 PROCEDURE — 3075F SYST BP GE 130 - 139MM HG: CPT | Mod: ,,, | Performed by: FAMILY MEDICINE

## 2023-09-18 PROCEDURE — 81003 URINALYSIS, REFLEX TO URINE CULTURE: ICD-10-PCS | Mod: QW,,, | Performed by: CLINICAL MEDICAL LABORATORY

## 2023-09-18 PROCEDURE — 3066F NEPHROPATHY DOC TX: CPT | Mod: ,,, | Performed by: FAMILY MEDICINE

## 2023-09-18 PROCEDURE — 1126F AMNT PAIN NOTED NONE PRSNT: CPT | Mod: ,,, | Performed by: FAMILY MEDICINE

## 2023-09-18 PROCEDURE — 80061 LIPID PANEL: CPT | Mod: ,,, | Performed by: CLINICAL MEDICAL LABORATORY

## 2023-09-18 PROCEDURE — 81003 URINALYSIS AUTO W/O SCOPE: CPT | Mod: QW,,, | Performed by: CLINICAL MEDICAL LABORATORY

## 2023-09-18 PROCEDURE — 1126F PR PAIN SEVERITY QUANTIFIED, NO PAIN PRESENT: ICD-10-PCS | Mod: ,,, | Performed by: FAMILY MEDICINE

## 2023-09-18 RX ORDER — LEVOTHYROXINE SODIUM 88 UG/1
88 TABLET ORAL DAILY
Qty: 90 TABLET | Refills: 1 | Status: SHIPPED | OUTPATIENT
Start: 2023-09-18 | End: 2024-03-18 | Stop reason: SDUPTHER

## 2023-09-18 RX ORDER — POTASSIUM CHLORIDE 750 MG/1
10 TABLET, EXTENDED RELEASE ORAL DAILY
Qty: 90 TABLET | Refills: 1 | Status: SHIPPED | OUTPATIENT
Start: 2023-09-18 | End: 2024-03-18 | Stop reason: SDUPTHER

## 2023-09-18 RX ORDER — LISINOPRIL 20 MG/1
20 TABLET ORAL DAILY
Qty: 90 TABLET | Refills: 1 | Status: SHIPPED | OUTPATIENT
Start: 2023-09-18 | End: 2024-03-18 | Stop reason: SDUPTHER

## 2023-09-18 RX ORDER — FLUTICASONE PROPIONATE 50 MCG
1 SPRAY, SUSPENSION (ML) NASAL DAILY
Qty: 16 G | Refills: 5 | Status: SHIPPED | OUTPATIENT
Start: 2023-09-18

## 2023-09-18 RX ORDER — DICLOFENAC SODIUM 10 MG/G
GEL TOPICAL
Qty: 200 G | Refills: 5 | Status: SHIPPED | OUTPATIENT
Start: 2023-09-18

## 2023-09-18 RX ORDER — AMLODIPINE BESYLATE 10 MG/1
10 TABLET ORAL DAILY
Qty: 90 TABLET | Refills: 1 | Status: SHIPPED | OUTPATIENT
Start: 2023-09-18 | End: 2024-03-18 | Stop reason: SDUPTHER

## 2023-09-18 RX ORDER — CARVEDILOL 6.25 MG/1
6.25 TABLET ORAL 2 TIMES DAILY
Qty: 90 TABLET | Refills: 1 | Status: SHIPPED | OUTPATIENT
Start: 2023-09-18 | End: 2023-12-26 | Stop reason: SDUPTHER

## 2023-09-18 NOTE — PROGRESS NOTES
Myles Sweet MD    [address] [place] [address]     PATIENT NAME: Yoanna Hurtado  : 1955  DATE: 23  MRN: [id number]      Billing Provider: Myles Sweet MD  Level of Service: [place] OFFICE/OUTPT VISIT, EST, LEVL IV, 30-39 MIN  Patient PCP Information       Provider PCP Type    Myles Sweet MD General            Reason for Visit / Chief Complaint: Medication Refill (Here today for medication refills. ), Foot Pain (Complain of left foot pain. Complain of swelling on inside of foot. Denies any kind of injury. ), and Health Maintenance (Refused pneumonia vaccine. )       Update PCP  Update Chief Complaint         History of Present Illness / Problem Focused Workflow     Yoanna Hurtado presents to the clinic with Medication Refill (Here today for medication refills. ), Foot Pain (Complain of left foot pain. Complain of swelling on inside of foot. Denies any kind of injury. ), and Health Maintenance (Refused pneumonia vaccine. )     Pain is intermittent, left foot pain, worse when she is walking or standing on it.     HPI    Review of Systems     Review of Systems   Constitutional:  Negative for activity change, appetite change, chills, fatigue and fever.   HENT:  Negative for nasal congestion, ear pain, hearing loss, postnasal drip and sore throat.    Respiratory:  Negative for cough, chest tightness, shortness of breath and wheezing.    Cardiovascular:  Negative for chest pain, palpitations, leg swelling and claudication.   Gastrointestinal:  Negative for abdominal pain, change in bowel habit, constipation, diarrhea, nausea, vomiting and change in bowel habit.   Genitourinary:  Negative for dysuria.   Musculoskeletal:  Positive for gait problem and joint swelling. Negative for arthralgias, back pain and myalgias.   Integumentary:  Negative for rash.   Neurological:  Negative for weakness and headaches.   Psychiatric/Behavioral:  Negative for  suicidal ideas. The patient is not nervous/anxious.         Medical / Social / Family History     Past Medical History:   Diagnosis Date    Hypertension     Hypothyroidism        Past Surgical History:   Procedure Laterality Date    HYSTERECTOMY      OOPHORECTOMY      TOTAL THYROIDECTOMY         Social History  Ms.  reports that she has never smoked. She has never been exposed to tobacco smoke. She has never used smokeless tobacco. She reports that she does not drink alcohol and does not use drugs.    Family History  Ms.'s family history is not on file.    Medications and Allergies     Medications  Outpatient Medications Marked as Taking for the 9/18/23 encounter (Office Visit) with Myles Sweet MD   Medication Sig Dispense Refill    calcium-vitamin D3 (OS-HORTENCIA 500 + D3) 500 mg-5 mcg (200 unit) per tablet Take 1 tablet by mouth 2 (two) times daily with meals.      cholecalciferol, vitamin D3, 125 mcg (5,000 unit) Tab Take 5,000 Units by mouth once daily.      cranberry fruit concentrate (AZO CRANBERRY) 250 mg Chew Take 1 tablet by mouth 4 (four) times daily as needed (for URINE SYMPTOMS). 30 tablet 2    magnesium 250 mg Tab Take by mouth once.      multivitamin/iron/folic acid (CENTRUM WOMEN ORAL) Take by mouth.      [DISCONTINUED] amLODIPine (NORVASC) 10 MG tablet Take 1 tablet (10 mg total) by mouth once daily. For BLOOD PRESSURE 30 tablet 0    [DISCONTINUED] carvediloL (COREG) 6.25 MG tablet Take 1 tablet (6.25 mg total) by mouth 2 (two) times daily. For BLOOD PRESSURE 60 tablet 0    [DISCONTINUED] fluticasone propionate (FLONASE) 50 mcg/actuation nasal spray 1 spray (50 mcg total) by Each Nostril route once daily. 16 g 5    [DISCONTINUED] lisinopriL (PRINIVIL,ZESTRIL) 20 MG tablet Take 1 tablet (20 mg total) by mouth once daily. For BLOOD PRESSURE 30 tablet 0    [DISCONTINUED] potassium chloride (KLOR-CON) 10 MEQ TbSR Take 1 tablet (10 mEq total) by mouth once daily. 30 tablet 0    [DISCONTINUED]  "SYNTHROID 88 mcg tablet Take 1 tablet (88 mcg total) by mouth once daily. For THYROID 30 tablet 0       Allergies  Review of patient's allergies indicates:   Allergen Reactions    Penicillins     Sulfa (sulfonamide antibiotics)        Physical Examination     Vitals:    09/18/23 0907   BP: 138/77   Pulse:    Resp:    Temp:      Vitals:    09/18/23 0852 09/18/23 0903 09/18/23 0907   BP: (!) 160/73 (!) 164/76 138/77   Pulse: 80     Resp: 20     Temp: 98.2 °F (36.8 °C)     TempSrc: Oral     SpO2: 99%     Weight: 67.1 kg (148 lb)     Height: 5' 5" (1.651 m)        Vitals:    09/18/23 0852 09/18/23 0903 09/18/23 0907   BP: (!) 160/73 (!) 164/76 138/77   Pulse: 80     Resp: 20     Temp: 98.2 °F (36.8 °C)     TempSrc: Oral     SpO2: 99%     Weight: 67.1 kg (148 lb)     Height: 5' 5" (1.651 m)        Physical Exam  Vitals and nursing note reviewed.   Constitutional:       General: She is not in acute distress.     Appearance: Normal appearance. She is not ill-appearing.   Eyes:      Extraocular Movements: Extraocular movements intact.      Pupils: Pupils are equal, round, and reactive to light.   Cardiovascular:      Rate and Rhythm: Normal rate and regular rhythm.      Heart sounds: Normal heart sounds.   Pulmonary:      Effort: Pulmonary effort is normal.      Breath sounds: Normal breath sounds.   Abdominal:      General: Bowel sounds are normal.      Palpations: Abdomen is soft.   Musculoskeletal:         General: Normal range of motion.   Skin:     Findings: No rash.   Neurological:      General: No focal deficit present.      Mental Status: She is alert and oriented to person, place, and time. Mental status is at baseline.   Psychiatric:         Mood and Affect: Mood normal.         Behavior: Behavior normal.            Assessment and Plan (including Health Maintenance)      Problem List  [employer]  Document Outside HM   :    Plan:     Topcial voltaren PRN     Refilled medications, labs today        Health " Maintenance Due   Topic Date Due    TETANUS VACCINE  Never done    Shingles Vaccine (1 of 2) Never done    Pneumococcal Vaccines (Age 65+) (1 - PCV) Never done    COVID-19 Vaccine (4 - Pfizer series) 12/18/2021       Problem List Items Addressed This Visit          ENT    Seasonal allergies (Chronic)    Relevant Medications    fluticasone propionate (FLONASE) 50 mcg/actuation nasal spray       Cardiac/Vascular    Primary hypertension - Primary (Chronic)    Overview      - Goal blood pressure for most patients is less than 130/85. Both numbers are important. If you have questions about your specific goal blood pressure, please ask at your clinic visits.   - Check blood pressure outside of clinic, record the numbers, and bring the log to all your office visits.   - If you have any chest pain, SOB, or other concerning symptoms, report these immediately, or go to the nearest ER.    - Recommend cardiovascular exercise, at least 3 times per week, for at least 15 minutes.   - Eat a heart healthy diet.            Relevant Medications    lisinopriL (PRINIVIL,ZESTRIL) 20 MG tablet    amLODIPine (NORVASC) 10 MG tablet    potassium chloride (KLOR-CON) 10 MEQ TbSR    carvediloL (COREG) 6.25 MG tablet    Other Relevant Orders    Microalbumin/Creatinine Ratio, Urine    CBC Auto Differential    Basic Metabolic Panel    Lipid Panel    Urinalysis, Reflex to Urine Culture       Endocrine    Acquired hypothyroidism (Chronic)    Relevant Medications    SYNTHROID 88 mcg tablet    Other Relevant Orders    TSH    T4, Free    Vitamin D deficiency (Chronic)     Other Visit Diagnoses       Left foot pain        Relevant Medications    diclofenac sodium (VOLTAREN) 1 % Gel            Health Maintenance Topics with due status: Not Due       Topic Last Completion Date    Colorectal Cancer Screening 10/05/2015    DEXA Scan 12/13/2022    Mammogram 12/19/2022    Lipid Panel 02/27/2023       Procedures     Future Appointments   Date Time Provider  Department Center   12/19/2023  8:00 AM [facility]        Follow up in about 6 months (around 3/18/2024) for chronic health problems, hypertension.     Signature:  Myles Sweet MD  [address] [place] [address]  Phone #: [phone]  Fax #: [phone]    Date of encounter: 9/18/23    There are no Patient Instructions on file for this visit.

## 2023-09-19 LAB
CREAT UR-MCNC: 19 MG/DL (ref 28–219)
MICROALBUMIN UR-MCNC: 1.6 MG/DL (ref 0–2.8)
MICROALBUMIN/CREAT RATIO PNL UR: 84.2 MG/G (ref 0–30)

## 2023-12-19 ENCOUNTER — HOSPITAL ENCOUNTER (OUTPATIENT)
Dept: RADIOLOGY | Facility: HOSPITAL | Age: 68
Discharge: HOME | End: 2023-12-19
Attending: FAMILY MEDICINE
Payer: MEDICARE

## 2023-12-19 VITALS — WEIGHT: 156 LBS | HEIGHT: 65 IN | BODY MASS INDEX: 25.99 KG/M2

## 2023-12-19 DIAGNOSIS — Z12.31 OTHER SCREENING MAMMOGRAM: ICD-10-CM

## 2023-12-19 PROCEDURE — 77067 SCR MAMMO BI INCL CAD: CPT | Mod: TC

## 2023-12-26 DIAGNOSIS — I10 PRIMARY HYPERTENSION: ICD-10-CM

## 2023-12-26 RX ORDER — CARVEDILOL 6.25 MG/1
6.25 TABLET ORAL 2 TIMES DAILY
Qty: 180 TABLET | Refills: 0 | Status: SHIPPED | OUTPATIENT
Start: 2023-12-26 | End: 2024-03-18 | Stop reason: SDUPTHER

## 2023-12-26 NOTE — TELEPHONE ENCOUNTER
----- Message from Che Brooks sent at 12/26/2023  3:28 PM CST -----  It was but the pharmacy told her it was out on that med    ----- Message -----  From: Kathryn Clifton LPN  Sent: 12/26/2023   1:17 PM CST  To: Che Brooks    A [facility] supply was sent on 09/18 she should still have medication at her pharmacy.  ----- Message -----  From: Che Brooks  Sent: 12/26/2023   1:09 PM CST  To: [facility] Staff    Pt came and stated that she needs a refill on carvedilol sent to [facility] good call back is [phone]

## 2024-01-09 DIAGNOSIS — Z71.89 COMPLEX CARE COORDINATION: ICD-10-CM

## 2024-03-18 ENCOUNTER — OFFICE VISIT (OUTPATIENT)
Dept: FAMILY MEDICINE | Facility: CLINIC | Age: 69
End: 2024-03-18
Payer: MEDICARE

## 2024-03-18 VITALS
BODY MASS INDEX: 24.66 KG/M2 | WEIGHT: 148 LBS | RESPIRATION RATE: 14 BRPM | SYSTOLIC BLOOD PRESSURE: 140 MMHG | OXYGEN SATURATION: 99 % | HEART RATE: 92 BPM | DIASTOLIC BLOOD PRESSURE: 70 MMHG | HEIGHT: 65 IN

## 2024-03-18 DIAGNOSIS — J30.2 SEASONAL ALLERGIES: Chronic | ICD-10-CM

## 2024-03-18 DIAGNOSIS — E55.9 VITAMIN D DEFICIENCY: Chronic | ICD-10-CM

## 2024-03-18 DIAGNOSIS — E03.9 ACQUIRED HYPOTHYROIDISM: Chronic | ICD-10-CM

## 2024-03-18 DIAGNOSIS — I10 PRIMARY HYPERTENSION: Primary | Chronic | ICD-10-CM

## 2024-03-18 LAB
T4 FREE SERPL-MCNC: 1.36 NG/DL (ref 0.76–1.46)
TSH SERPL DL<=0.005 MIU/L-ACNC: 0.36 UIU/ML (ref 0.36–3.74)

## 2024-03-18 PROCEDURE — 1160F RVW MEDS BY RX/DR IN RCRD: CPT | Mod: ,,, | Performed by: FAMILY MEDICINE

## 2024-03-18 PROCEDURE — 3008F BODY MASS INDEX DOCD: CPT | Mod: ,,, | Performed by: FAMILY MEDICINE

## 2024-03-18 PROCEDURE — 84439 ASSAY OF FREE THYROXINE: CPT | Mod: ,,, | Performed by: CLINICAL MEDICAL LABORATORY

## 2024-03-18 PROCEDURE — 3077F SYST BP >= 140 MM HG: CPT | Mod: ,,, | Performed by: FAMILY MEDICINE

## 2024-03-18 PROCEDURE — 1159F MED LIST DOCD IN RCRD: CPT | Mod: ,,, | Performed by: FAMILY MEDICINE

## 2024-03-18 PROCEDURE — 99214 OFFICE O/P EST MOD 30 MIN: CPT | Mod: ,,, | Performed by: FAMILY MEDICINE

## 2024-03-18 PROCEDURE — 1126F AMNT PAIN NOTED NONE PRSNT: CPT | Mod: ,,, | Performed by: FAMILY MEDICINE

## 2024-03-18 PROCEDURE — 3288F FALL RISK ASSESSMENT DOCD: CPT | Mod: ,,, | Performed by: FAMILY MEDICINE

## 2024-03-18 PROCEDURE — 84443 ASSAY THYROID STIM HORMONE: CPT | Mod: ,,, | Performed by: CLINICAL MEDICAL LABORATORY

## 2024-03-18 PROCEDURE — 1101F PT FALLS ASSESS-DOCD LE1/YR: CPT | Mod: ,,, | Performed by: FAMILY MEDICINE

## 2024-03-18 PROCEDURE — 3078F DIAST BP <80 MM HG: CPT | Mod: ,,, | Performed by: FAMILY MEDICINE

## 2024-03-18 RX ORDER — POTASSIUM CHLORIDE 750 MG/1
10 TABLET, EXTENDED RELEASE ORAL DAILY
Qty: 90 TABLET | Refills: 1 | Status: SHIPPED | OUTPATIENT
Start: 2024-03-18

## 2024-03-18 RX ORDER — AMLODIPINE BESYLATE 10 MG/1
10 TABLET ORAL DAILY
Qty: 90 TABLET | Refills: 1 | Status: SHIPPED | OUTPATIENT
Start: 2024-03-18 | End: 2024-03-18

## 2024-03-18 RX ORDER — LISINOPRIL 20 MG/1
20 TABLET ORAL DAILY
Qty: 90 TABLET | Refills: 1 | Status: SHIPPED | OUTPATIENT
Start: 2024-03-18 | End: 2024-03-18

## 2024-03-18 RX ORDER — CARVEDILOL 6.25 MG/1
6.25 TABLET ORAL 2 TIMES DAILY
Qty: 180 TABLET | Refills: 1 | Status: SHIPPED | OUTPATIENT
Start: 2024-03-18 | End: 2024-03-18

## 2024-03-18 RX ORDER — CARVEDILOL 12.5 MG/1
12.5 TABLET ORAL 2 TIMES DAILY WITH MEALS
Qty: 180 TABLET | Refills: 1 | Status: SHIPPED | OUTPATIENT
Start: 2024-03-18 | End: 2025-03-18

## 2024-03-18 RX ORDER — LEVOTHYROXINE SODIUM 88 UG/1
88 TABLET ORAL DAILY
Qty: 90 TABLET | Refills: 1 | Status: SHIPPED | OUTPATIENT
Start: 2024-03-18

## 2024-03-18 RX ORDER — LISINOPRIL 40 MG/1
40 TABLET ORAL DAILY
Qty: 90 TABLET | Refills: 1 | Status: SHIPPED | OUTPATIENT
Start: 2024-03-18 | End: 2024-04-19

## 2024-03-18 NOTE — PROGRESS NOTES
Myles Sweet MD    [address] [place] [address]     PATIENT NAME: Yoanna Hurtado  : 1955  DATE: 3/18/24  MRN: [id number]      Billing Provider: Myles Sweet MD  Level of Service: [place] OFFICE/OUTPT VISIT, EST, LEVL IV, 30-39 MIN  Patient PCP Information       Provider PCP Type    Myles Sweet MD General            Reason for Visit / Chief Complaint: Medication Refill (Pt is here for follow up visit and medication refills. )       Update PCP  Update Chief Complaint         History of Present Illness / Problem Focused Workflow     Yoanna Hurtado presents to the clinic with Medication Refill (Pt is here for follow up visit and medication refills. )     Patient reports gingival hyperplasia- concerned it may be caused by amlodipine which is a known side effect     HPI    Review of Systems     Review of Systems   Constitutional:  Negative for activity change, appetite change, chills, fatigue and fever.   HENT:  Negative for nasal congestion, ear pain, hearing loss, postnasal drip and sore throat.    Respiratory:  Negative for cough, chest tightness, shortness of breath and wheezing.    Cardiovascular:  Negative for chest pain, palpitations, leg swelling and claudication.   Gastrointestinal:  Negative for abdominal pain, change in bowel habit, constipation, diarrhea, nausea and vomiting.   Genitourinary:  Negative for dysuria.   Musculoskeletal:  Negative for arthralgias, back pain, gait problem and myalgias.   Integumentary:  Negative for rash.   Neurological:  Negative for weakness and headaches.   Psychiatric/Behavioral:  Negative for suicidal ideas. The patient is not nervous/anxious.         Medical / Social / Family History     Past Medical History:   Diagnosis Date    Hypertension     Hypothyroidism        Past Surgical History:   Procedure Laterality Date    HYSTERECTOMY      OOPHORECTOMY      TOTAL THYROIDECTOMY         Social History  Ms.   reports that she has never smoked. She has never been exposed to tobacco smoke. She has never used smokeless tobacco. She reports that she does not drink alcohol and does not use drugs.    Family History  Ms.'s family history is not on file.    Medications and Allergies     Medications  Outpatient Medications Marked as Taking for the 3/18/24 encounter (Office Visit) with Myles Sweet MD   Medication Sig Dispense Refill    calcium-vitamin D3 (OS-HORTENCIA 500 + D3) 500 mg-5 mcg (200 unit) per tablet Take 1 tablet by mouth 2 (two) times daily with meals.      cholecalciferol, vitamin D3, 125 mcg (5,000 unit) Tab Take 5,000 Units by mouth once daily.      cranberry fruit concentrate (AZO CRANBERRY) 250 mg Chew Take 1 tablet by mouth 4 (four) times daily as needed (for URINE SYMPTOMS). 30 tablet 2    diclofenac sodium (VOLTAREN) 1 % Gel Apply a thin layer of gel to the left foot twice daily as needed for PAIN and INFLAMMATION 200 g 5    fluticasone propionate (FLONASE) 50 mcg/actuation nasal spray 1 spray (50 mcg total) by Each Nostril route once daily. 16 g 5    magnesium 250 mg Tab Take by mouth once.      multivitamin/iron/folic acid (CENTRUM WOMEN ORAL) Take by mouth.      [DISCONTINUED] amLODIPine (NORVASC) 10 MG tablet Take 1 tablet (10 mg total) by mouth once daily. For BLOOD PRESSURE 90 tablet 1    [DISCONTINUED] carvediloL (COREG) 6.25 MG tablet Take 1 tablet (6.25 mg total) by mouth 2 (two) times daily. For BLOOD PRESSURE 180 tablet 0    [DISCONTINUED] lisinopriL (PRINIVIL,ZESTRIL) 20 MG tablet Take 1 tablet (20 mg total) by mouth once daily. For BLOOD PRESSURE 90 tablet 1    [DISCONTINUED] potassium chloride (KLOR-CON) 10 MEQ TbSR Take 1 tablet (10 mEq total) by mouth once daily. 90 tablet 1    [DISCONTINUED] SYNTHROID 88 mcg tablet Take 1 tablet (88 mcg total) by mouth once daily. For THYROID 90 tablet 1       Allergies  Review of patient's allergies indicates:   Allergen Reactions    Hydrochlorothiazide  Other (See Comments)     Low potassium     Penicillins     Sulfa (sulfonamide antibiotics)        Physical Examination     Vitals:    03/18/24 0939   BP: (!) 140/70   Pulse:    Resp:      Physical Exam  Vitals and nursing note reviewed.   Constitutional:       General: She is not in acute distress.     Appearance: Normal appearance. She is not ill-appearing.   Eyes:      Extraocular Movements: Extraocular movements intact.      Pupils: Pupils are equal, round, and reactive to light.   Cardiovascular:      Rate and Rhythm: Normal rate and regular rhythm.      Heart sounds: Normal heart sounds.   Pulmonary:      Effort: Pulmonary effort is normal.      Breath sounds: Normal breath sounds.   Abdominal:      General: Bowel sounds are normal.      Palpations: Abdomen is soft.   Musculoskeletal:         General: Normal range of motion.   Skin:     Findings: No rash.   Neurological:      General: No focal deficit present.      Mental Status: She is alert and oriented to person, place, and time. Mental status is at baseline.   Psychiatric:         Mood and Affect: Mood normal.         Behavior: Behavior normal.            Assessment and Plan (including Health Maintenance)      Problem List  [employer]  Document Outside HM   :    Plan:     HTN - stop amlodipine due to gingival overgrowth. Increase lisinopril to 40mg and cervedilol to 12.5 BID     She will monitor her blood pressure outside of clinic     Health Maintenance Due   Topic Date Due    TETANUS VACCINE  Never done    Shingles Vaccine (1 of 2) Never done    RSV Vaccine (Age 60+ and Pregnant patients) (1 - 1-dose 60+ series) Never done    Pneumococcal Vaccines (Age 65+) (1 of 1 - PCV) Never done    COVID-19 Vaccine (4 - 2023-24 season) 09/01/2023       Problem List Items Addressed This Visit          ENT    Seasonal allergies (Chronic)       Cardiac/Vascular    Primary hypertension - Primary (Chronic)    Overview      - Goal blood pressure for most patients is less  than 130/85. Both numbers are important. If you have questions about your specific goal blood pressure, please ask at your clinic visits.   - Check blood pressure outside of clinic, record the numbers, and bring the log to all your office visits.   - If you have any chest pain, SOB, or other concerning symptoms, report these immediately, or go to the nearest ER.    - Recommend cardiovascular exercise, at least 3 times per week, for at least 15 minutes.   - Eat a heart healthy diet.            Relevant Medications    potassium chloride (KLOR-CON) 10 MEQ TbSR    carvediloL (COREG) 12.5 MG tablet    lisinopriL (PRINIVIL,ZESTRIL) 40 MG tablet       Endocrine    Acquired hypothyroidism (Chronic)    Relevant Medications    SYNTHROID 88 mcg tablet    Other Relevant Orders    T4, Free    TSH    Vitamin D deficiency (Chronic)       Health Maintenance Topics with due status: Not Due       Topic Last Completion Date    Colorectal Cancer Screening 10/05/2015    DEXA Scan 12/13/2022    Lipid Panel 09/18/2023    Mammogram 12/19/2023       Procedures     Future Appointments   Date Time Provider Department Center   8/9/2024  9:00 AM [facility], [facility] FAMILY [facility]IC[facility] KAYLEIGH Morales   12/24/2024  8:20 AM [facility] [place] [facility]        Follow up in about 6 months (around 9/18/2024) for chronic health problems, hypertension.     Signature:  Myles Sweet MD  [address] [place] [address]  Phone #: [phone]  Fax #: [phone]    Date of encounter: 3/18/24    There are no Patient Instructions on file for this visit.

## 2024-04-19 ENCOUNTER — OFFICE VISIT (OUTPATIENT)
Dept: FAMILY MEDICINE | Facility: CLINIC | Age: 69
End: 2024-04-19
Payer: MEDICARE

## 2024-04-19 VITALS
DIASTOLIC BLOOD PRESSURE: 82 MMHG | WEIGHT: 147.63 LBS | BODY MASS INDEX: 24.6 KG/M2 | HEIGHT: 65 IN | HEART RATE: 80 BPM | OXYGEN SATURATION: 98 % | SYSTOLIC BLOOD PRESSURE: 198 MMHG | RESPIRATION RATE: 16 BRPM

## 2024-04-19 DIAGNOSIS — I10 PRIMARY HYPERTENSION: Primary | Chronic | ICD-10-CM

## 2024-04-19 DIAGNOSIS — J30.2 SEASONAL ALLERGIES: Chronic | ICD-10-CM

## 2024-04-19 PROCEDURE — 99214 OFFICE O/P EST MOD 30 MIN: CPT | Mod: ,,, | Performed by: FAMILY MEDICINE

## 2024-04-19 PROCEDURE — 3008F BODY MASS INDEX DOCD: CPT | Mod: ,,, | Performed by: FAMILY MEDICINE

## 2024-04-19 PROCEDURE — 1101F PT FALLS ASSESS-DOCD LE1/YR: CPT | Mod: ,,, | Performed by: FAMILY MEDICINE

## 2024-04-19 PROCEDURE — 1126F AMNT PAIN NOTED NONE PRSNT: CPT | Mod: ,,, | Performed by: FAMILY MEDICINE

## 2024-04-19 PROCEDURE — 1159F MED LIST DOCD IN RCRD: CPT | Mod: ,,, | Performed by: FAMILY MEDICINE

## 2024-04-19 PROCEDURE — 3077F SYST BP >= 140 MM HG: CPT | Mod: ,,, | Performed by: FAMILY MEDICINE

## 2024-04-19 PROCEDURE — 3079F DIAST BP 80-89 MM HG: CPT | Mod: ,,, | Performed by: FAMILY MEDICINE

## 2024-04-19 PROCEDURE — 1160F RVW MEDS BY RX/DR IN RCRD: CPT | Mod: ,,, | Performed by: FAMILY MEDICINE

## 2024-04-19 PROCEDURE — 4010F ACE/ARB THERAPY RXD/TAKEN: CPT | Mod: ,,, | Performed by: FAMILY MEDICINE

## 2024-04-19 PROCEDURE — 3288F FALL RISK ASSESSMENT DOCD: CPT | Mod: ,,, | Performed by: FAMILY MEDICINE

## 2024-04-19 RX ORDER — OLMESARTAN MEDOXOMIL 40 MG/1
40 TABLET ORAL DAILY
Qty: 90 TABLET | Refills: 0 | Status: SHIPPED | OUTPATIENT
Start: 2024-04-19 | End: 2024-05-23 | Stop reason: SDUPTHER

## 2024-04-19 NOTE — PROGRESS NOTES
Myles Sweet MD    [address] [place] [address]     PATIENT NAME: Yoanna Hurtado  : 1955  DATE: 24  MRN: [id number]      Billing Provider: Myles Sweet MD  Level of Service: [place] OFFICE/OUTPT VISIT, EST, LEVL IV, 30-39 MIN  Patient PCP Information       Provider PCP Type    Myles Sweet MD General            Reason for Visit / Chief Complaint: Hypertension (BP has been elevated. She states she was seen 1 month ago for elevated BP and some of her medications were changed. She has been keeping a log of her BP readings and her top number has been running high.), Sinusitis, and Nasal Congestion (Off and on for the past week.)       Update PCP  Update Chief Complaint         History of Present Illness / Problem Focused Workflow     Yoanna Hurtado presents to the clinic with Hypertension (BP has been elevated. She states she was seen 1 month ago for elevated BP and some of her medications were changed. She has been keeping a log of her BP readings and her top number has been running high.), Sinusitis, and Nasal Congestion (Off and on for the past week.)     Continues to have isolated systolic HTN,     HPI    Review of Systems     Review of Systems   Constitutional:  Negative for activity change, appetite change, chills, fatigue and fever.   HENT:  Negative for nasal congestion, ear pain, hearing loss, postnasal drip and sore throat.    Respiratory:  Negative for cough, chest tightness, shortness of breath and wheezing.    Cardiovascular:  Negative for chest pain, palpitations, leg swelling and claudication.   Gastrointestinal:  Negative for abdominal pain, change in bowel habit, constipation, diarrhea, nausea and vomiting.   Genitourinary:  Negative for dysuria.   Musculoskeletal:  Negative for arthralgias, back pain, gait problem and myalgias.   Integumentary:  Negative for rash.   Neurological:  Negative for weakness and headaches.    Psychiatric/Behavioral:  Negative for suicidal ideas. The patient is not nervous/anxious.         Medical / Social / Family History     Past Medical History:   Diagnosis Date    Hypertension     Hypothyroidism        Past Surgical History:   Procedure Laterality Date    HYSTERECTOMY      OOPHORECTOMY      TOTAL THYROIDECTOMY         Social History  Ms.  reports that she has never smoked. She has never been exposed to tobacco smoke. She has never used smokeless tobacco. She reports that she does not drink alcohol and does not use drugs.    Family History  Ms.'s family history is not on file.    Medications and Allergies     Medications  Current Outpatient Medications   Medication Sig Dispense Refill    calcium-vitamin D3 (OS-HORTENCIA 500 + D3) 500 mg-5 mcg (200 unit) per tablet Take 1 tablet by mouth 2 (two) times daily with meals.      carvediloL (COREG) 12.5 MG tablet Take 1 tablet (12.5 mg total) by mouth 2 (two) times daily with meals. For BLOOD PRESSURE 180 tablet 1    cholecalciferol, vitamin D3, 125 mcg (5,000 unit) Tab Take 5,000 Units by mouth once daily.      cranberry fruit concentrate (AZO CRANBERRY) 250 mg Chew Take 1 tablet by mouth 4 (four) times daily as needed (for URINE SYMPTOMS). 30 tablet 2    diclofenac sodium (VOLTAREN) 1 % Gel Apply a thin layer of gel to the left foot twice daily as needed for PAIN and INFLAMMATION 200 g 5    fluticasone propionate (FLONASE) 50 mcg/actuation nasal spray 1 spray (50 mcg total) by Each Nostril route once daily. 16 g 5    magnesium 250 mg Tab Take by mouth once.      potassium chloride (KLOR-CON) 10 MEQ TbSR Take 1 tablet (10 mEq total) by mouth once daily. 90 tablet 1    SYNTHROID 88 mcg tablet Take 1 tablet (88 mcg total) by mouth once daily. For THYROID 90 tablet 1    olmesartan (BENICAR) 40 MG tablet Take 1 tablet (40 mg total) by mouth once daily. For BLOOD PRESSURE 90 tablet 0     No current facility-administered medications for this visit.        Allergies  Review of patient's allergies indicates:   Allergen Reactions    Hydrochlorothiazide Other (See Comments)     Low potassium     Penicillins     Sulfa (sulfonamide antibiotics)        Physical Examination     Vitals:    04/19/24 1017   BP: (!) 198/82   Pulse:    Resp:      Physical Exam  Vitals and nursing note reviewed.   Constitutional:       General: She is not in acute distress.     Appearance: Normal appearance. She is not ill-appearing.   Eyes:      Extraocular Movements: Extraocular movements intact.      Pupils: Pupils are equal, round, and reactive to light.   Cardiovascular:      Rate and Rhythm: Normal rate and regular rhythm.      Heart sounds: Normal heart sounds.   Pulmonary:      Effort: Pulmonary effort is normal.      Breath sounds: Normal breath sounds.   Abdominal:      General: Bowel sounds are normal.      Palpations: Abdomen is soft.   Musculoskeletal:         General: Normal range of motion.   Skin:     Findings: No rash.   Neurological:      General: No focal deficit present.      Mental Status: She is alert and oriented to person, place, and time. Mental status is at baseline.   Psychiatric:         Mood and Affect: Mood normal.         Behavior: Behavior normal.            Assessment and Plan (including Health Maintenance)      Problem List  [employer]  Document Outside HM   :    Plan:     Stop Lisinopril, start benicar     Continue carvedilol     Consider amlodipine to be a poor option due to presumed gingival overgrowth from its use     Monitor blood pressure outside of clinic and keep a log     Health Maintenance Due   Topic Date Due    TETANUS VACCINE  Never done    Shingles Vaccine (1 of 2) Never done    RSV Vaccine (Age 60+ and Pregnant patients) (1 - 1-dose 60+ series) Never done    Pneumococcal Vaccines (Age 65+) (1 of 1 - PCV) Never done    COVID-19 Vaccine (4 - 2023-24 season) 09/01/2023       Problem List Items Addressed This Visit          ENT    Seasonal  allergies (Chronic)       Cardiac/Vascular    Primary hypertension - Primary (Chronic)    Overview      - Goal blood pressure for most patients is less than 130/85. Both numbers are important. If you have questions about your specific goal blood pressure, please ask at your clinic visits.   - Check blood pressure outside of clinic, record the numbers, and bring the log to all your office visits.   - If you have any chest pain, SOB, or other concerning symptoms, report these immediately, or go to the nearest ER.    - Recommend cardiovascular exercise, at least 3 times per week, for at least 15 minutes.   - Eat a heart healthy diet.            Relevant Medications    olmesartan (BENICAR) 40 MG tablet       Health Maintenance Topics with due status: Not Due       Topic Last Completion Date    Colorectal Cancer Screening 10/05/2015    DEXA Scan 12/13/2022    Lipid Panel 09/18/2023    Mammogram 12/19/2023       Procedures     Future Appointments   Date Time Provider Department Center   8/9/2024  9:00 AM [facility]URSE, [facility] FAMILY MEDICINE [facility] KAYLEIGH Morales   12/24/2024  8:20 AM [facility] [place] [facility]        Follow up in about 4 months (around 8/19/2024) for chronic health problems, hypertension.     Signature:  Myles Sweet MD  [address] [place] [address]  Phone #: [phone]  Fax #: [phone]    Date of encounter: 4/19/24    There are no Patient Instructions on file for this visit.

## 2024-05-07 ENCOUNTER — OFFICE VISIT (OUTPATIENT)
Dept: FAMILY MEDICINE | Facility: CLINIC | Age: 69
End: 2024-05-07
Payer: MEDICARE

## 2024-05-07 VITALS
HEIGHT: 65 IN | BODY MASS INDEX: 24.69 KG/M2 | OXYGEN SATURATION: 98 % | SYSTOLIC BLOOD PRESSURE: 154 MMHG | TEMPERATURE: 98 F | RESPIRATION RATE: 16 BRPM | HEART RATE: 78 BPM | WEIGHT: 148.19 LBS | DIASTOLIC BLOOD PRESSURE: 87 MMHG

## 2024-05-07 DIAGNOSIS — I10 PRIMARY HYPERTENSION: Primary | Chronic | ICD-10-CM

## 2024-05-07 PROCEDURE — 3288F FALL RISK ASSESSMENT DOCD: CPT | Mod: ,,, | Performed by: FAMILY MEDICINE

## 2024-05-07 PROCEDURE — 3079F DIAST BP 80-89 MM HG: CPT | Mod: ,,, | Performed by: FAMILY MEDICINE

## 2024-05-07 PROCEDURE — 3008F BODY MASS INDEX DOCD: CPT | Mod: ,,, | Performed by: FAMILY MEDICINE

## 2024-05-07 PROCEDURE — 3077F SYST BP >= 140 MM HG: CPT | Mod: ,,, | Performed by: FAMILY MEDICINE

## 2024-05-07 PROCEDURE — 1159F MED LIST DOCD IN RCRD: CPT | Mod: ,,, | Performed by: FAMILY MEDICINE

## 2024-05-07 PROCEDURE — 99213 OFFICE O/P EST LOW 20 MIN: CPT | Mod: ,,, | Performed by: FAMILY MEDICINE

## 2024-05-07 PROCEDURE — 1126F AMNT PAIN NOTED NONE PRSNT: CPT | Mod: ,,, | Performed by: FAMILY MEDICINE

## 2024-05-07 PROCEDURE — 1160F RVW MEDS BY RX/DR IN RCRD: CPT | Mod: ,,, | Performed by: FAMILY MEDICINE

## 2024-05-07 PROCEDURE — 1101F PT FALLS ASSESS-DOCD LE1/YR: CPT | Mod: ,,, | Performed by: FAMILY MEDICINE

## 2024-05-07 PROCEDURE — 4010F ACE/ARB THERAPY RXD/TAKEN: CPT | Mod: ,,, | Performed by: FAMILY MEDICINE

## 2024-05-07 RX ORDER — CHLORTHALIDONE 25 MG/1
25 TABLET ORAL DAILY
Qty: 30 TABLET | Refills: 2 | Status: SHIPPED | OUTPATIENT
Start: 2024-05-07 | End: 2024-05-23 | Stop reason: SDUPTHER

## 2024-05-07 RX ORDER — LORATADINE 10 MG/1
10 TABLET ORAL DAILY
Qty: 30 TABLET | Refills: 2 | Status: SHIPPED | OUTPATIENT
Start: 2024-05-07 | End: 2025-05-07

## 2024-05-07 NOTE — PROGRESS NOTES
Myles Sweet MD    [address] [place] [address]     PATIENT NAME: Yoanna Hurtado  : 1955  DATE: 24  MRN: [id number]      Billing Provider: Myles Sweet MD  Level of Service: [place] OFFICE/OUTPT VISIT, EST, LEVL III, 20-29 MIN  Patient PCP Information       Provider PCP Type    Myles Sweet MD General            Reason for Visit / Chief Complaint: Hypertension (1 month follow up on elevated BP. She has a log of BP readings )       Update PCP  Update Chief Complaint         History of Present Illness / Problem Focused Workflow     Yoanna Hurtado presents to the clinic with Hypertension (1 month follow up on elevated BP. She has a log of BP readings )     HPI    Review of Systems     Review of Systems   Constitutional:  Negative for activity change, appetite change, chills, fatigue and fever.   HENT:  Negative for nasal congestion, ear pain, hearing loss, postnasal drip and sore throat.    Respiratory:  Negative for cough, chest tightness, shortness of breath and wheezing.    Cardiovascular:  Negative for chest pain, palpitations, leg swelling and claudication.   Gastrointestinal:  Negative for abdominal pain, change in bowel habit, constipation, diarrhea, nausea and vomiting.   Genitourinary:  Negative for dysuria.   Musculoskeletal:  Negative for arthralgias, back pain, gait problem and myalgias.   Integumentary:  Negative for rash.   Neurological:  Negative for weakness and headaches.   Psychiatric/Behavioral:  Negative for suicidal ideas. The patient is not nervous/anxious.         Medical / Social / Family History     Past Medical History:   Diagnosis Date    Hypertension     Hypothyroidism        Past Surgical History:   Procedure Laterality Date    HYSTERECTOMY      OOPHORECTOMY      TOTAL THYROIDECTOMY         Social History  Ms.  reports that she has never smoked. She has never been exposed to tobacco smoke. She has never used smokeless  tobacco. She reports that she does not drink alcohol and does not use drugs.    Family History  Ms.'s family history is not on file.    Medications and Allergies     Medications  Outpatient Medications Marked as Taking for the 5/7/24 encounter (Office Visit) with Myles Sweet MD   Medication Sig Dispense Refill    calcium-vitamin D3 (OS-HORTENCIA 500 + D3) 500 mg-5 mcg (200 unit) per tablet Take 1 tablet by mouth 2 (two) times daily with meals.      carvediloL (COREG) 12.5 MG tablet Take 1 tablet (12.5 mg total) by mouth 2 (two) times daily with meals. For BLOOD PRESSURE 180 tablet 1    cholecalciferol, vitamin D3, 125 mcg (5,000 unit) Tab Take 5,000 Units by mouth once daily.      cranberry fruit concentrate (AZO CRANBERRY) 250 mg Chew Take 1 tablet by mouth 4 (four) times daily as needed (for URINE SYMPTOMS). 30 tablet 2    diclofenac sodium (VOLTAREN) 1 % Gel Apply a thin layer of gel to the left foot twice daily as needed for PAIN and INFLAMMATION 200 g 5    fluticasone propionate (FLONASE) 50 mcg/actuation nasal spray 1 spray (50 mcg total) by Each Nostril route once daily. 16 g 5    magnesium 250 mg Tab Take by mouth once.      olmesartan (BENICAR) 40 MG tablet Take 1 tablet (40 mg total) by mouth once daily. For BLOOD PRESSURE 90 tablet 0    potassium chloride (KLOR-CON) 10 MEQ TbSR Take 1 tablet (10 mEq total) by mouth once daily. 90 tablet 1    SYNTHROID 88 mcg tablet Take 1 tablet (88 mcg total) by mouth once daily. For THYROID 90 tablet 1       Allergies  Review of patient's allergies indicates:   Allergen Reactions    Hydrochlorothiazide Other (See Comments)     Low potassium     Penicillins     Sulfa (sulfonamide antibiotics)        Physical Examination     Vitals:    05/07/24 1636   BP: (!) 154/87   Pulse: 78   Resp:    Temp:      Physical Exam  Vitals and nursing note reviewed.   Constitutional:       General: She is not in acute distress.     Appearance: Normal appearance. She is not ill-appearing.    Eyes:      Extraocular Movements: Extraocular movements intact.      Pupils: Pupils are equal, round, and reactive to light.   Cardiovascular:      Rate and Rhythm: Normal rate and regular rhythm.      Heart sounds: Normal heart sounds.   Pulmonary:      Effort: Pulmonary effort is normal.      Breath sounds: Normal breath sounds.   Abdominal:      General: Bowel sounds are normal.      Palpations: Abdomen is soft.   Musculoskeletal:         General: Normal range of motion.   Skin:     Findings: No rash.   Neurological:      General: No focal deficit present.      Mental Status: She is alert and oriented to person, place, and time. Mental status is at baseline.   Psychiatric:         Mood and Affect: Mood normal.         Behavior: Behavior normal.            Assessment and Plan (including Health Maintenance)      Problem List  Smart Sets  Document Outside HM   :    Plan:     Added chlorthalidone 25mg, will monitor for signs of low potassium     Health Maintenance Due   Topic Date Due    TETANUS VACCINE  Never done    Shingles Vaccine (1 of 2) Never done    RSV Vaccine (Age 60+ and Pregnant patients) (1 - 1-dose 60+ series) Never done    Pneumococcal Vaccines (Age 65+) (1 of 1 - PCV) Never done    COVID-19 Vaccine (4 - 2023-24 season) 09/01/2023       Problem List Items Addressed This Visit          Cardiac/Vascular    Primary hypertension - Primary (Chronic)    Overview      - Goal blood pressure for most patients is less than 130/85. Both numbers are important. If you have questions about your specific goal blood pressure, please ask at your clinic visits.   - Check blood pressure outside of clinic, record the numbers, and bring the log to all your office visits.   - If you have any chest pain, SOB, or other concerning symptoms, report these immediately, or go to the nearest ER.    - Recommend cardiovascular exercise, at least 3 times per week, for at least 15 minutes.   - Eat a heart healthy diet.             Relevant Medications    chlorthalidone (HYGROTEN) 25 MG Tab    loratadine (CLARITIN) 10 mg tablet       Health Maintenance Topics with due status: Not Due       Topic Last Completion Date    Colorectal Cancer Screening 10/05/2015    DEXA Scan 12/13/2022    Lipid Panel 09/18/2023    Mammogram 12/19/2023       Procedures     Future Appointments   Date Time Provider Department Center   8/9/2024  9:00 AM AWV NURSE, [facility] FAMILY MEDICINE [facility] KAYLEIGH Betancourt [place]   12/24/2024  8:20 AM [facility] [place] [facility]        Follow up in about 3 months (around 8/7/2024), or if symptoms worsen or fail to improve, for chronic health problems, hypertension.     Signature:  Myles Sweet MD  [address] [place] [address]  Phone #: [phone]  Fax #: [phone]    Date of encounter: 5/7/24    There are no Patient Instructions on file for this visit.

## 2024-05-23 ENCOUNTER — OFFICE VISIT (OUTPATIENT)
Dept: FAMILY MEDICINE | Facility: CLINIC | Age: 69
End: 2024-05-23
Payer: MEDICARE

## 2024-05-23 VITALS
RESPIRATION RATE: 16 BRPM | SYSTOLIC BLOOD PRESSURE: 134 MMHG | HEIGHT: 65 IN | WEIGHT: 147.63 LBS | HEART RATE: 73 BPM | OXYGEN SATURATION: 98 % | BODY MASS INDEX: 24.6 KG/M2 | DIASTOLIC BLOOD PRESSURE: 82 MMHG | TEMPERATURE: 98 F

## 2024-05-23 DIAGNOSIS — I10 PRIMARY HYPERTENSION: Chronic | ICD-10-CM

## 2024-05-23 PROCEDURE — 1126F AMNT PAIN NOTED NONE PRSNT: CPT | Mod: ,,, | Performed by: FAMILY MEDICINE

## 2024-05-23 PROCEDURE — 4010F ACE/ARB THERAPY RXD/TAKEN: CPT | Mod: ,,, | Performed by: FAMILY MEDICINE

## 2024-05-23 PROCEDURE — 3075F SYST BP GE 130 - 139MM HG: CPT | Mod: ,,, | Performed by: FAMILY MEDICINE

## 2024-05-23 PROCEDURE — 3008F BODY MASS INDEX DOCD: CPT | Mod: ,,, | Performed by: FAMILY MEDICINE

## 2024-05-23 PROCEDURE — 3079F DIAST BP 80-89 MM HG: CPT | Mod: ,,, | Performed by: FAMILY MEDICINE

## 2024-05-23 PROCEDURE — 1159F MED LIST DOCD IN RCRD: CPT | Mod: ,,, | Performed by: FAMILY MEDICINE

## 2024-05-23 PROCEDURE — 99213 OFFICE O/P EST LOW 20 MIN: CPT | Mod: ,,, | Performed by: FAMILY MEDICINE

## 2024-05-23 PROCEDURE — 3288F FALL RISK ASSESSMENT DOCD: CPT | Mod: ,,, | Performed by: FAMILY MEDICINE

## 2024-05-23 PROCEDURE — 1101F PT FALLS ASSESS-DOCD LE1/YR: CPT | Mod: ,,, | Performed by: FAMILY MEDICINE

## 2024-05-23 PROCEDURE — 1160F RVW MEDS BY RX/DR IN RCRD: CPT | Mod: ,,, | Performed by: FAMILY MEDICINE

## 2024-05-23 RX ORDER — CHLORTHALIDONE 25 MG/1
25 TABLET ORAL DAILY
Qty: 90 TABLET | Refills: 1 | Status: SHIPPED | OUTPATIENT
Start: 2024-05-23 | End: 2025-05-23

## 2024-05-23 RX ORDER — OLMESARTAN MEDOXOMIL 40 MG/1
40 TABLET ORAL DAILY
Qty: 90 TABLET | Refills: 1 | Status: SHIPPED | OUTPATIENT
Start: 2024-05-23 | End: 2025-05-23

## 2024-05-23 NOTE — PROGRESS NOTES
Myles Sweet MD    [address] [place] [address]     PATIENT NAME: Yoanna Hurtado  : 1955  DATE: 24  MRN: [id number]      Billing Provider: Myles Sweet MD  Level of Service: [place] OFFICE/OUTPT VISIT, EST, LEVL III, 20-29 MIN  Patient PCP Information       Provider PCP Type    Myles Sweet MD General            Reason for Visit / Chief Complaint: Hypertension (Follow up on hypertension)       Update PCP  Update Chief Complaint         History of Present Illness / Problem Focused Workflow     Yoanna Hurtado presents to the clinic with Hypertension (Follow up on hypertension)     She has been monitoring her blood pressure outside of clinic.     HPI    Review of Systems     Review of Systems   Constitutional:  Negative for activity change, appetite change, chills, fatigue and fever.   HENT:  Negative for nasal congestion, ear pain, hearing loss, postnasal drip and sore throat.    Respiratory:  Negative for cough, chest tightness, shortness of breath and wheezing.    Cardiovascular:  Negative for chest pain, palpitations, leg swelling and claudication.   Gastrointestinal:  Negative for abdominal pain, change in bowel habit, constipation, diarrhea, nausea and vomiting.   Genitourinary:  Negative for dysuria.   Musculoskeletal:  Negative for arthralgias, back pain, gait problem and myalgias.   Integumentary:  Negative for rash.   Neurological:  Negative for weakness and headaches.   Psychiatric/Behavioral:  Negative for suicidal ideas. The patient is not nervous/anxious.         Medical / Social / Family History     Past Medical History:   Diagnosis Date    Hypertension     Hypothyroidism        Past Surgical History:   Procedure Laterality Date    HYSTERECTOMY      OOPHORECTOMY      TOTAL THYROIDECTOMY         Social History  Ms.  reports that she has never smoked. She has never been exposed to tobacco smoke. She has never used smokeless tobacco.  She reports that she does not drink alcohol and does not use drugs.    Family History  Ms.'s family history is not on file.    Medications and Allergies     Medications  Outpatient Medications Marked as Taking for the 5/23/24 encounter (Office Visit) with Myles Sweet MD   Medication Sig Dispense Refill    calcium-vitamin D3 (OS-HORTENCIA 500 + D3) 500 mg-5 mcg (200 unit) per tablet Take 1 tablet by mouth 2 (two) times daily with meals.      carvediloL (COREG) 12.5 MG tablet Take 1 tablet (12.5 mg total) by mouth 2 (two) times daily with meals. For BLOOD PRESSURE 180 tablet 1    cholecalciferol, vitamin D3, 125 mcg (5,000 unit) Tab Take 5,000 Units by mouth once daily.      cranberry fruit concentrate (AZO CRANBERRY) 250 mg Chew Take 1 tablet by mouth 4 (four) times daily as needed (for URINE SYMPTOMS). 30 tablet 2    diclofenac sodium (VOLTAREN) 1 % Gel Apply a thin layer of gel to the left foot twice daily as needed for PAIN and INFLAMMATION 200 g 5    fluticasone propionate (FLONASE) 50 mcg/actuation nasal spray 1 spray (50 mcg total) by Each Nostril route once daily. 16 g 5    loratadine (CLARITIN) 10 mg tablet Take 1 tablet (10 mg total) by mouth once daily. For ALLERGIES 30 tablet 2    magnesium 250 mg Tab Take by mouth once.      potassium chloride (KLOR-CON) 10 MEQ TbSR Take 1 tablet (10 mEq total) by mouth once daily. 90 tablet 1    SYNTHROID 88 mcg tablet Take 1 tablet (88 mcg total) by mouth once daily. For THYROID 90 tablet 1    [DISCONTINUED] chlorthalidone (HYGROTEN) 25 MG Tab Take 1 tablet (25 mg total) by mouth once daily. For BLOOD PRESSURE 30 tablet 2    [DISCONTINUED] olmesartan (BENICAR) 40 MG tablet Take 1 tablet (40 mg total) by mouth once daily. For BLOOD PRESSURE 90 tablet 0       Allergies  Review of patient's allergies indicates:   Allergen Reactions    Hydrochlorothiazide Other (See Comments)     Low potassium     Penicillins     Sulfa (sulfonamide antibiotics)        Physical  Examination     Vitals:    05/23/24 1600   BP: 134/82   Pulse: 73   Resp: 16   Temp: 97.6 °F (36.4 °C)     Physical Exam  Constitutional:       Appearance: Normal appearance.   HENT:      Head: Normocephalic and atraumatic.   Eyes:      Extraocular Movements: Extraocular movements intact.   Cardiovascular:      Rate and Rhythm: Normal rate and regular rhythm.   Pulmonary:      Effort: Pulmonary effort is normal.      Breath sounds: Normal breath sounds.   Skin:     General: Skin is warm.   Neurological:      Mental Status: She is alert and oriented to person, place, and time. Mental status is at baseline.            Assessment and Plan (including Health Maintenance)      Problem List  Smart Sets  Document Outside HM   :    Plan:     I believe her HTN has been well controlled.     Health Maintenance Due   Topic Date Due    TETANUS VACCINE  Never done    Shingles Vaccine (1 of 2) Never done    RSV Vaccine (Age 60+ and Pregnant patients) (1 - 1-dose 60+ series) Never done    Pneumococcal Vaccines (Age 65+) (1 of 1 - PCV) Never done    COVID-19 Vaccine (4 - 2023-24 season) 09/01/2023       Problem List Items Addressed This Visit          Cardiac/Vascular    Primary hypertension (Chronic)    Overview      - Goal blood pressure for most patients is less than 130/85. Both numbers are important. If you have questions about your specific goal blood pressure, please ask at your clinic visits.   - Check blood pressure outside of clinic, record the numbers, and bring the log to all your office visits.   - If you have any chest pain, SOB, or other concerning symptoms, report these immediately, or go to the nearest ER.    - Recommend cardiovascular exercise, at least 3 times per week, for at least 15 minutes.   - Eat a heart healthy diet.            Relevant Medications    chlorthalidone (HYGROTEN) 25 MG Tab    olmesartan (BENICAR) 40 MG tablet       Health Maintenance Topics with due status: Not Due       Topic Last Completion  Date    Colorectal Cancer Screening 10/05/2015    DEXA Scan 12/13/2022    Lipid Panel 09/18/2023    Mammogram 12/19/2023       Procedures     Future Appointments   Date Time Provider Department Center   8/9/2024  9:00 AM AWV NURSE, [facility] FAMILY MEDICINE [facility] KAYLEIGH Betancourt [place]   12/24/2024  8:20 AM [facility] [place] [facility]        Follow up in 6 months (on 11/23/2024), or if symptoms worsen or fail to improve, for chronic health problems, hypertension.     Signature:  Myles Sweet MD  [address] [place] [address]  Phone #: [phone]  Fax #: [phone]    Date of encounter: 5/23/24    There are no Patient Instructions on file for this visit.

## 2024-07-16 ENCOUNTER — OFFICE VISIT (OUTPATIENT)
Dept: FAMILY MEDICINE | Facility: CLINIC | Age: 69
End: 2024-07-16
Payer: MEDICARE

## 2024-07-16 VITALS
DIASTOLIC BLOOD PRESSURE: 80 MMHG | BODY MASS INDEX: 24.69 KG/M2 | HEART RATE: 74 BPM | OXYGEN SATURATION: 99 % | TEMPERATURE: 98 F | SYSTOLIC BLOOD PRESSURE: 174 MMHG | HEIGHT: 65 IN | RESPIRATION RATE: 18 BRPM | WEIGHT: 148.19 LBS

## 2024-07-16 DIAGNOSIS — R30.0 DYSURIA: Primary | ICD-10-CM

## 2024-07-16 LAB
BILIRUB SERPL-MCNC: NEGATIVE MG/DL
BLOOD URINE, POC: ABNORMAL
CLARITY, POC UA: CLEAR
COLOR, POC UA: YELLOW
GLUCOSE UR QL STRIP: NEGATIVE
KETONES UR QL STRIP: NEGATIVE
LEUKOCYTE ESTERASE URINE, POC: NEGATIVE
NITRITE, POC UA: NEGATIVE
PH, POC UA: 6.5
PROTEIN, POC: NEGATIVE
SPECIFIC GRAVITY, POC UA: 1.01
UROBILINOGEN, POC UA: 0.2

## 2024-07-16 PROCEDURE — 1160F RVW MEDS BY RX/DR IN RCRD: CPT | Mod: ,,, | Performed by: NURSE PRACTITIONER

## 2024-07-16 PROCEDURE — 4010F ACE/ARB THERAPY RXD/TAKEN: CPT | Mod: ,,, | Performed by: NURSE PRACTITIONER

## 2024-07-16 PROCEDURE — 3008F BODY MASS INDEX DOCD: CPT | Mod: ,,, | Performed by: NURSE PRACTITIONER

## 2024-07-16 PROCEDURE — 99214 OFFICE O/P EST MOD 30 MIN: CPT | Mod: ,,, | Performed by: NURSE PRACTITIONER

## 2024-07-16 PROCEDURE — 1101F PT FALLS ASSESS-DOCD LE1/YR: CPT | Mod: ,,, | Performed by: NURSE PRACTITIONER

## 2024-07-16 PROCEDURE — 81003 URINALYSIS AUTO W/O SCOPE: CPT | Mod: QW,,, | Performed by: NURSE PRACTITIONER

## 2024-07-16 PROCEDURE — 1126F AMNT PAIN NOTED NONE PRSNT: CPT | Mod: ,,, | Performed by: NURSE PRACTITIONER

## 2024-07-16 PROCEDURE — 3288F FALL RISK ASSESSMENT DOCD: CPT | Mod: ,,, | Performed by: NURSE PRACTITIONER

## 2024-07-16 PROCEDURE — 1159F MED LIST DOCD IN RCRD: CPT | Mod: ,,, | Performed by: NURSE PRACTITIONER

## 2024-07-16 PROCEDURE — 3079F DIAST BP 80-89 MM HG: CPT | Mod: ,,, | Performed by: NURSE PRACTITIONER

## 2024-07-16 PROCEDURE — 3077F SYST BP >= 140 MM HG: CPT | Mod: ,,, | Performed by: NURSE PRACTITIONER

## 2024-07-16 RX ORDER — NITROFURANTOIN 25; 75 MG/1; MG/1
100 CAPSULE ORAL 2 TIMES DAILY
Qty: 10 CAPSULE | Refills: 0 | Status: SHIPPED | OUTPATIENT
Start: 2024-07-16

## 2024-07-16 NOTE — PROGRESS NOTES
Carrie Leone DNP, JACOB    [address] [place] [address]     PATIENT NAME: Yoanna Hurtado  : 1955  DATE: 24  MRN: [id number]      Billing Provider: Carrie Leone DNP, FNP  Level of Service:   Patient PCP Information       Provider PCP Type    Myles Sweet MD General            Reason for Visit / Chief Complaint: Dysuria (Burning with urination) and Flank Pain (Symptoms started over the weekend)       Update PCP  Update Chief Complaint         History of Present Illness / Problem Focused Workflow     Yoanna Hurtado presents to the clinic with Dysuria (Burning with urination) and Flank Pain (Symptoms started over the weekend)     Dysuria   Associated symptoms include flank pain. Pertinent negatives include no chills, nausea, vomiting, constipation or rash.   Flank Pain  Associated symptoms include dysuria. Pertinent negatives include no abdominal pain, chest pain, fever, headaches or weakness.       Review of Systems     Review of Systems   Constitutional:  Negative for activity change, appetite change, chills, fatigue and fever.   HENT:  Negative for nasal congestion, ear pain, hearing loss, postnasal drip and sore throat.    Respiratory:  Negative for cough, chest tightness, shortness of breath and wheezing.    Cardiovascular:  Negative for chest pain, palpitations, leg swelling and claudication.   Gastrointestinal:  Negative for abdominal pain, change in bowel habit, constipation, diarrhea, nausea and vomiting.   Genitourinary:  Positive for dysuria and flank pain.   Musculoskeletal:  Negative for arthralgias, back pain, gait problem and myalgias.   Integumentary:  Negative for rash.   Neurological:  Negative for weakness and headaches.   Psychiatric/Behavioral:  Negative for suicidal ideas. The patient is not nervous/anxious.         Medical / Social / Family History     Past Medical History:   Diagnosis Date    Hypertension     Hypothyroidism         Past Surgical History:   Procedure Laterality Date    HYSTERECTOMY      OOPHORECTOMY      TOTAL THYROIDECTOMY         Social History  Ms. Yoanna Hurtado  reports that she has never smoked. She has never been exposed to tobacco smoke. She has never used smokeless tobacco. She reports that she does not drink alcohol and does not use drugs.    Family History  Ms. Yoanna Hurtado's family history is not on file.    Medications and Allergies     Medications  Outpatient Medications Marked as Taking for the 7/16/24 encounter (Office Visit) with Carrie Leone, MARIVEL, FNP   Medication Sig Dispense Refill    calcium-vitamin D3 (OS-HORTENCIA 500 + D3) 500 mg-5 mcg (200 unit) per tablet Take 1 tablet by mouth once daily.      carvediloL (COREG) 12.5 MG tablet Take 1 tablet (12.5 mg total) by mouth 2 (two) times daily with meals. For BLOOD PRESSURE 180 tablet 1    chlorthalidone (HYGROTEN) 25 MG Tab Take 1 tablet (25 mg total) by mouth once daily. For BLOOD PRESSURE 90 tablet 1    cholecalciferol, vitamin D3, 125 mcg (5,000 unit) Tab Take 5,000 Units by mouth once daily.      cranberry fruit concentrate (AZO CRANBERRY) 250 mg Chew Take 1 tablet by mouth 4 (four) times daily as needed (for URINE SYMPTOMS). 30 tablet 2    diclofenac sodium (VOLTAREN) 1 % Gel Apply a thin layer of gel to the left foot twice daily as needed for PAIN and INFLAMMATION 200 g 5    fluticasone propionate (FLONASE) 50 mcg/actuation nasal spray 1 spray (50 mcg total) by Each Nostril route once daily. 16 g 5    loratadine (CLARITIN) 10 mg tablet Take 1 tablet (10 mg total) by mouth once daily. For ALLERGIES 30 tablet 2    magnesium 250 mg Tab Take by mouth once.      olmesartan (BENICAR) 40 MG tablet Take 1 tablet (40 mg total) by mouth once daily. For BLOOD PRESSURE 90 tablet 1    potassium chloride (KLOR-CON) 10 MEQ TbSR Take 1 tablet (10 mEq total) by mouth once daily. 90 tablet 1    SYNTHROID 88 mcg tablet Take 1 tablet (88 mcg total) by mouth once daily. For  "THYROID 90 tablet 1       Allergies  Review of patient's allergies indicates:   Allergen Reactions    Hydrochlorothiazide Other (See Comments)     Low potassium     Penicillins     Sulfa (sulfonamide antibiotics)        Physical Examination     Vitals:    07/16/24 0824 07/16/24 0835   BP: (!) 193/73 (!) 174/80   BP Location: Left arm Right arm   Patient Position: Sitting Sitting   BP Method: Large (Automatic) Large (Automatic)   Pulse: 74    Resp: 18    Temp: 97.5 °F (36.4 °C)    TempSrc: Oral    SpO2: 99%    Weight: 67.2 kg (148 lb 3.2 oz)    Height: 5' 5" (1.651 m)      Physical Exam  Vitals and nursing note reviewed.   Constitutional:       General: She is not in acute distress.     Appearance: Normal appearance. She is not ill-appearing.   Eyes:      Extraocular Movements: Extraocular movements intact.      Pupils: Pupils are equal, round, and reactive to light.   Cardiovascular:      Rate and Rhythm: Normal rate and regular rhythm.      Heart sounds: Normal heart sounds.   Pulmonary:      Effort: Pulmonary effort is normal.      Breath sounds: Normal breath sounds.   Abdominal:      General: Bowel sounds are normal. There is no distension.      Palpations: Abdomen is soft.      Tenderness: There is no abdominal tenderness. There is no right CVA tenderness or left CVA tenderness.   Musculoskeletal:         General: Normal range of motion.   Skin:     Findings: No rash.   Neurological:      General: No focal deficit present.      Mental Status: She is alert and oriented to person, place, and time. Mental status is at baseline.   Psychiatric:         Mood and Affect: Mood normal.         Behavior: Behavior normal.          Assessment and Plan (including Health Maintenance)      Problem List  Smart Sets  Document Outside HM   :    Plan:         Health Maintenance Due   Topic Date Due    TETANUS VACCINE  Never done    Shingles Vaccine (1 of 2) Never done    RSV Vaccine (Age 60+ and Pregnant patients) (1 - 1-dose 60+ " series) Never done    Pneumococcal Vaccines (Age 65+) (1 of 1 - PCV) Never done    COVID-19 Vaccine (4 - 2023-24 season) 09/01/2023       Problem List Items Addressed This Visit    None  Visit Diagnoses       Dysuria    -  Primary    Relevant Medications    nitrofurantoin, macrocrystal-monohydrate, (MACROBID) 100 MG capsule    Other Relevant Orders    POCT URINALYSIS W/O SCOPE (Completed)    Urinalysis, Reflex to Urine Culture          Dysuria  -     POCT URINALYSIS W/O SCOPE  -     Urinalysis, Reflex to Urine Culture  -     nitrofurantoin, macrocrystal-monohydrate, (MACROBID) 100 MG capsule; Take 1 capsule (100 mg total) by mouth 2 (two) times daily.  Dispense: 10 capsule; Refill: 0       Health Maintenance Topics with due status: Not Due       Topic Last Completion Date    Colorectal Cancer Screening 10/05/2015    DEXA Scan 12/13/2022    Lipid Panel 09/18/2023    Mammogram 12/19/2023         Future Appointments   Date Time Provider Department Center   8/9/2024  9:00 AM AWV NURSE, [facility] FAMILY MEDICINE [facility] KAYLEIGH Morales   12/24/2024  8:20 AM [facility]        No follow-ups on file.     Signature:  Carrie Leone DNP, FNP  [address] [place] [address]  Phone #: [phone]  Fax #: [phone]    Date of encounter: 7/16/24    Patient Instructions   Increase fluid intake. Take meds as prescribed. Complete all antibiotics. Will call patient with urine culture results.

## 2024-08-07 NOTE — PROGRESS NOTES
"  Yoanna Hurtado presented for a  Medicare AWV and comprehensive Health Risk Assessment today. The following components were reviewed and updated:    Medical history  Family History  Social history  Allergies and Current Medications  Health Risk Assessment  Health Maintenance  Care Team         ** See Completed Assessments for Annual Wellness Visit within the encounter summary.**         The following assessments were completed:  Living Situation  CAGE  Depression Screening  Timed Get Up and Go  Whisper Test  Cognitive Function Screening  Nutrition Screening  ADL Screening  PAQ Screening        Opioid Documentation    does not have a current opioid prescription.       Vitals:    08/09/24 1013   BP: 126/78   Pulse: 68   Resp: 14   Temp: 98.7 °F (37.1 °C)   SpO2: 97%   Weight: 67.1 kg (148 lb)   Height: 5' 5" (1.651 m)     Body mass index is 24.63 kg/m².  Physical Exam  Constitutional:       Appearance: Normal appearance.   HENT:      Head: Normocephalic and atraumatic.   Eyes:      Extraocular Movements: Extraocular movements intact.   Cardiovascular:      Rate and Rhythm: Normal rate and regular rhythm.   Pulmonary:      Effort: Pulmonary effort is normal.      Breath sounds: Normal breath sounds.   Skin:     General: Skin is warm.   Neurological:      Mental Status: She is alert and oriented to person, place, and time. Mental status is at baseline.               Diagnoses and health risks identified today and associated recommendations/orders:    Problem List Items Addressed This Visit          Cardiac/Vascular    Primary hypertension (Chronic)    Relevant Medications    carvediloL (COREG) 12.5 MG tablet    chlorthalidone (HYGROTEN) 25 MG Tab    potassium chloride (KLOR-CON) 10 MEQ TbSR    olmesartan (BENICAR) 40 MG tablet       Endocrine    Acquired hypothyroidism (Chronic)    Relevant Medications    SYNTHROID 88 mcg tablet     Other Visit Diagnoses       Encounter for subsequent annual wellness visit (AWV) in " Medicare patient    -  Primary    BMI 25.0-25.9,adult        Left foot pain        Relevant Medications    diclofenac sodium (VOLTAREN) 1 % Gel    Dysuria            Chronic Health Problems are stable and no changes are made.     Provided Yoanna with a 5-10 year written screening schedule and personal prevention plan. Recommendations were developed using the USPSTF age appropriate recommendations. Education, counseling, and referrals were provided as needed. After Visit Summary printed and given to patient which includes a list of additional screenings\tests needed.    No follow-ups on file.    Myles Sweet MD     I offered to discuss advanced care planning, including how to pick a person who would make decisions for you if you were unable to make them for yourself, called a health care power of , and what kind of decisions you might make such as use of life sustaining treatments such as ventilators and tube feeding when faced with a life limiting illness recorded on a living will that they will need to know. (How you want to be cared for as you near the end of your natural life)     X Patient is interested in learning more about how to make advanced directives.  I provided them paperwork and offered to discuss this with them.

## 2024-08-09 ENCOUNTER — OFFICE VISIT (OUTPATIENT)
Dept: FAMILY MEDICINE | Facility: CLINIC | Age: 69
End: 2024-08-09
Payer: MEDICARE

## 2024-08-09 VITALS
TEMPERATURE: 99 F | BODY MASS INDEX: 24.66 KG/M2 | RESPIRATION RATE: 14 BRPM | HEIGHT: 65 IN | HEART RATE: 68 BPM | OXYGEN SATURATION: 97 % | WEIGHT: 148 LBS | SYSTOLIC BLOOD PRESSURE: 126 MMHG | DIASTOLIC BLOOD PRESSURE: 78 MMHG

## 2024-08-09 DIAGNOSIS — Z71.89 COMPLEX CARE COORDINATION: ICD-10-CM

## 2024-08-09 DIAGNOSIS — E03.9 ACQUIRED HYPOTHYROIDISM: ICD-10-CM

## 2024-08-09 DIAGNOSIS — M79.672 LEFT FOOT PAIN: ICD-10-CM

## 2024-08-09 DIAGNOSIS — I10 PRIMARY HYPERTENSION: ICD-10-CM

## 2024-08-09 DIAGNOSIS — Z00.00 ENCOUNTER FOR SUBSEQUENT ANNUAL WELLNESS VISIT (AWV) IN MEDICARE PATIENT: Primary | ICD-10-CM

## 2024-08-09 DIAGNOSIS — R30.0 DYSURIA: ICD-10-CM

## 2024-08-09 PROCEDURE — 3078F DIAST BP <80 MM HG: CPT | Mod: ,,, | Performed by: FAMILY MEDICINE

## 2024-08-09 PROCEDURE — 3288F FALL RISK ASSESSMENT DOCD: CPT | Mod: ,,, | Performed by: FAMILY MEDICINE

## 2024-08-09 PROCEDURE — 1158F ADVNC CARE PLAN TLK DOCD: CPT | Mod: ,,, | Performed by: FAMILY MEDICINE

## 2024-08-09 PROCEDURE — 1159F MED LIST DOCD IN RCRD: CPT | Mod: ,,, | Performed by: FAMILY MEDICINE

## 2024-08-09 PROCEDURE — 3074F SYST BP LT 130 MM HG: CPT | Mod: ,,, | Performed by: FAMILY MEDICINE

## 2024-08-09 PROCEDURE — 1126F AMNT PAIN NOTED NONE PRSNT: CPT | Mod: ,,, | Performed by: FAMILY MEDICINE

## 2024-08-09 PROCEDURE — 4010F ACE/ARB THERAPY RXD/TAKEN: CPT | Mod: ,,, | Performed by: FAMILY MEDICINE

## 2024-08-09 PROCEDURE — 1160F RVW MEDS BY RX/DR IN RCRD: CPT | Mod: ,,, | Performed by: FAMILY MEDICINE

## 2024-08-09 PROCEDURE — 1101F PT FALLS ASSESS-DOCD LE1/YR: CPT | Mod: ,,, | Performed by: FAMILY MEDICINE

## 2024-08-09 PROCEDURE — G0439 PPPS, SUBSEQ VISIT: HCPCS | Mod: ,,, | Performed by: FAMILY MEDICINE

## 2024-08-09 RX ORDER — CARVEDILOL 12.5 MG/1
12.5 TABLET ORAL 2 TIMES DAILY WITH MEALS
Qty: 180 TABLET | Refills: 1 | Status: SHIPPED | OUTPATIENT
Start: 2024-08-09 | End: 2025-08-09

## 2024-08-09 RX ORDER — CHLORTHALIDONE 25 MG/1
25 TABLET ORAL DAILY
Qty: 90 TABLET | Refills: 1 | Status: SHIPPED | OUTPATIENT
Start: 2024-08-09 | End: 2025-08-09

## 2024-08-09 RX ORDER — LEVOTHYROXINE SODIUM 88 UG/1
88 TABLET ORAL DAILY
Qty: 90 TABLET | Refills: 1 | Status: SHIPPED | OUTPATIENT
Start: 2024-08-09

## 2024-08-09 RX ORDER — FERROUS SULFATE, DRIED 160(50) MG
1 TABLET, EXTENDED RELEASE ORAL DAILY
Qty: 90 TABLET | Refills: 1 | Status: SHIPPED | OUTPATIENT
Start: 2024-08-09

## 2024-08-09 RX ORDER — DICLOFENAC SODIUM 10 MG/G
GEL TOPICAL
Qty: 200 G | Refills: 5 | Status: SHIPPED | OUTPATIENT
Start: 2024-08-09

## 2024-08-09 RX ORDER — OLMESARTAN MEDOXOMIL 40 MG/1
40 TABLET ORAL DAILY
Qty: 90 TABLET | Refills: 1 | Status: SHIPPED | OUTPATIENT
Start: 2024-08-09 | End: 2025-08-09

## 2024-08-09 RX ORDER — POTASSIUM CHLORIDE 750 MG/1
10 TABLET, EXTENDED RELEASE ORAL DAILY
Qty: 90 TABLET | Refills: 1 | Status: SHIPPED | OUTPATIENT
Start: 2024-08-09

## 2024-08-09 RX ORDER — NITROFURANTOIN 25; 75 MG/1; MG/1
100 CAPSULE ORAL 2 TIMES DAILY
Qty: 10 CAPSULE | Refills: 0 | Status: CANCELLED | OUTPATIENT
Start: 2024-08-09

## 2024-08-09 NOTE — PATIENT INSTRUCTIONS
Counseling and Referral of Other Preventative  (Italic type indicates deductible and co-insurance are waived)    Patient Name: Yoanna Hurtado  Today's Date: 8/9/2024    Health Maintenance       Date Due Completion Date    TETANUS VACCINE Never done ---    Shingles Vaccine (1 of 2) Never done ---    RSV Vaccine (Age 60+ and Pregnant patients) (1 - 1-dose 60+ series) Never done ---    Pneumococcal Vaccines (Age 65+) (1 of 1 - PCV) Never done ---    COVID-19 Vaccine (4 - 2023-24 season) 09/01/2023 10/23/2021    Lipid Panel 09/18/2024 9/18/2023    Override on 2/8/2021: Done    DEXA Scan 12/13/2024 12/13/2022    Mammogram 12/19/2024 12/19/2023    Colorectal Cancer Screening 10/05/2025 10/5/2015        No orders of the defined types were placed in this encounter.    The following information is provided to all patients.  This information is to help you find resources for any of the problems found today that may be affecting your health:                  Living healthy guide: [place].gov    Understanding Diabetes: www.diabetes.org      Eating healthy: www.cdc.gov/healthyweight      CDC home safety checklist: www.cdc.gov/steadi/patient.html      Agency on Aging: [place].gov    Alcoholics anonymous (AA): www.aa.org      Physical Activity: www.xenia.nih.gov/ju4kzxg      Tobacco use: [place].gov

## 2024-11-20 ENCOUNTER — OFFICE VISIT (OUTPATIENT)
Dept: FAMILY MEDICINE | Facility: CLINIC | Age: 69
End: 2024-11-20
Payer: MEDICARE

## 2024-11-20 VITALS
SYSTOLIC BLOOD PRESSURE: 169 MMHG | HEIGHT: 65 IN | DIASTOLIC BLOOD PRESSURE: 90 MMHG | WEIGHT: 152 LBS | BODY MASS INDEX: 25.33 KG/M2 | OXYGEN SATURATION: 99 % | TEMPERATURE: 72 F | RESPIRATION RATE: 18 BRPM

## 2024-11-20 DIAGNOSIS — R35.0 FREQUENT URINATION: ICD-10-CM

## 2024-11-20 DIAGNOSIS — I10 PRIMARY HYPERTENSION: Chronic | ICD-10-CM

## 2024-11-20 DIAGNOSIS — N30.91 CYSTITIS WITH HEMATURIA: Primary | ICD-10-CM

## 2024-11-20 PROCEDURE — 3288F FALL RISK ASSESSMENT DOCD: CPT | Mod: ,,,

## 2024-11-20 PROCEDURE — 1160F RVW MEDS BY RX/DR IN RCRD: CPT | Mod: ,,,

## 2024-11-20 PROCEDURE — 3008F BODY MASS INDEX DOCD: CPT | Mod: ,,,

## 2024-11-20 PROCEDURE — 1159F MED LIST DOCD IN RCRD: CPT | Mod: ,,,

## 2024-11-20 PROCEDURE — 1101F PT FALLS ASSESS-DOCD LE1/YR: CPT | Mod: ,,,

## 2024-11-20 PROCEDURE — 99213 OFFICE O/P EST LOW 20 MIN: CPT | Mod: ,,,

## 2024-11-20 PROCEDURE — 3079F DIAST BP 80-89 MM HG: CPT | Mod: ,,,

## 2024-11-20 PROCEDURE — 3077F SYST BP >= 140 MM HG: CPT | Mod: ,,,

## 2024-11-20 PROCEDURE — 4010F ACE/ARB THERAPY RXD/TAKEN: CPT | Mod: ,,,

## 2024-11-20 RX ORDER — NITROFURANTOIN 25; 75 MG/1; MG/1
100 CAPSULE ORAL 2 TIMES DAILY
Qty: 20 CAPSULE | Refills: 0 | Status: SHIPPED | OUTPATIENT
Start: 2024-11-20 | End: 2024-11-30

## 2024-11-20 NOTE — PROGRESS NOTES
Subjective     Patient ID: Yoanna Hurtado is a 69 y.o. female.    Chief Complaint: Urinary Tract Infection (Burning with urination and frequent urination x 3 days)    Urinary Tract Infection   Associated symptoms include frequency.     Review of Systems   Genitourinary:  Positive for dysuria and frequency.          Objective     Physical Exam  Vitals and nursing note reviewed.   Constitutional:       Appearance: Normal appearance. She is normal weight.   HENT:      Head: Normocephalic.      Nose: Nose normal.      Mouth/Throat:      Mouth: Mucous membranes are moist.      Pharynx: Oropharynx is clear.   Eyes:      Pupils: Pupils are equal, round, and reactive to light.   Cardiovascular:      Rate and Rhythm: Normal rate and regular rhythm.   Pulmonary:      Effort: Pulmonary effort is normal.      Breath sounds: Normal breath sounds.   Abdominal:      General: Bowel sounds are normal.      Palpations: Abdomen is soft.   Musculoskeletal:         General: Normal range of motion.      Cervical back: Normal range of motion.      Comments: No cva tenderness noted   Skin:     General: Skin is warm and dry.      Capillary Refill: Capillary refill takes less than 2 seconds.   Neurological:      General: No focal deficit present.      Mental Status: She is alert and oriented to person, place, and time.   Psychiatric:         Mood and Affect: Mood normal.            Assessment and Plan     1. Cystitis with hematuria  -     nitrofurantoin, macrocrystal-monohydrate, (MACROBID) 100 MG capsule; Take 1 capsule (100 mg total) by mouth 2 (two) times daily. for 10 days  Dispense: 20 capsule; Refill: 0    2. Frequent urination  -     Urinalysis, Reflex to Urine Culture; Future; Expected date: 11/20/2024    3. Primary hypertension  Overview:   - Goal blood pressure for most patients is less than 130/85. Both numbers are important. If you have questions about your specific goal blood pressure, please ask at your clinic visits.   - Check  blood pressure outside of clinic, record the numbers, and bring the log to all your office visits.   - If you have any chest pain, SOB, or other concerning symptoms, report these immediately, or go to the nearest ER.    - Recommend cardiovascular exercise, at least 3 times per week, for at least 15 minutes.   - Eat a heart healthy diet.         Will call with culture results. Pt denies fever at home. Pt declines BP intervention. Advised to monitor BP at home and f/u with PCP if bp remains elevated             No follow-ups on file.

## 2024-12-24 ENCOUNTER — HOSPITAL ENCOUNTER (OUTPATIENT)
Dept: RADIOLOGY | Facility: HOSPITAL | Age: 69
Discharge: HOME | End: 2024-12-24
Attending: FAMILY MEDICINE
Payer: MEDICARE

## 2024-12-24 VITALS — HEIGHT: 65 IN | WEIGHT: 152 LBS | BODY MASS INDEX: 25.33 KG/M2

## 2024-12-24 DIAGNOSIS — Z12.31 OTHER SCREENING MAMMOGRAM: ICD-10-CM

## 2024-12-24 PROCEDURE — 77063 BREAST TOMOSYNTHESIS BI: CPT | Mod: 26,,, | Performed by: RADIOLOGY

## 2024-12-24 PROCEDURE — 77067 SCR MAMMO BI INCL CAD: CPT | Mod: TC

## 2024-12-24 PROCEDURE — 77067 SCR MAMMO BI INCL CAD: CPT | Mod: 26,,, | Performed by: RADIOLOGY

## 2025-01-03 ENCOUNTER — TELEPHONE (OUTPATIENT)
Dept: FAMILY MEDICINE | Facility: CLINIC | Age: 70
End: 2025-01-03
Payer: MEDICARE

## 2025-01-03 NOTE — TELEPHONE ENCOUNTER
Spoke with patient and she stated she is doing better after taking the antibiotic prescribed for the UTI.

## 2025-02-10 ENCOUNTER — OFFICE VISIT (OUTPATIENT)
Dept: FAMILY MEDICINE | Facility: CLINIC | Age: 70
End: 2025-02-10
Payer: MEDICARE

## 2025-02-10 VITALS
HEIGHT: 65 IN | OXYGEN SATURATION: 100 % | BODY MASS INDEX: 25.33 KG/M2 | HEART RATE: 66 BPM | RESPIRATION RATE: 18 BRPM | SYSTOLIC BLOOD PRESSURE: 162 MMHG | TEMPERATURE: 98 F | DIASTOLIC BLOOD PRESSURE: 75 MMHG | WEIGHT: 152 LBS

## 2025-02-10 DIAGNOSIS — E55.9 VITAMIN D DEFICIENCY: Chronic | ICD-10-CM

## 2025-02-10 DIAGNOSIS — E03.9 ACQUIRED HYPOTHYROIDISM: Chronic | ICD-10-CM

## 2025-02-10 DIAGNOSIS — I10 PRIMARY HYPERTENSION: Primary | Chronic | ICD-10-CM

## 2025-02-10 DIAGNOSIS — J30.2 SEASONAL ALLERGIES: Chronic | ICD-10-CM

## 2025-02-10 LAB
ANION GAP SERPL CALCULATED.3IONS-SCNC: 12 MMOL/L (ref 7–16)
BASOPHILS # BLD AUTO: 0.01 K/UL (ref 0–0.2)
BASOPHILS NFR BLD AUTO: 0.3 % (ref 0–1)
BUN SERPL-MCNC: 18 MG/DL (ref 10–20)
BUN/CREAT SERPL: 18 (ref 6–20)
CALCIUM SERPL-MCNC: 10.1 MG/DL (ref 8.4–10.2)
CHLORIDE SERPL-SCNC: 101 MMOL/L (ref 98–107)
CHOLEST SERPL-MCNC: 201 MG/DL
CHOLEST/HDLC SERPL: 2.6 {RATIO}
CO2 SERPL-SCNC: 29 MMOL/L (ref 23–31)
CREAT SERPL-MCNC: 0.98 MG/DL (ref 0.55–1.02)
CREAT UR-MCNC: 70 MG/DL (ref 15–325)
DIFFERENTIAL METHOD BLD: ABNORMAL
EGFR (NO RACE VARIABLE) (RUSH/TITUS): 63 ML/MIN/1.73M2
EOSINOPHIL # BLD AUTO: 0.02 K/UL (ref 0–0.5)
EOSINOPHIL NFR BLD AUTO: 0.7 % (ref 1–4)
ERYTHROCYTE [DISTWIDTH] IN BLOOD BY AUTOMATED COUNT: 13.2 % (ref 11.5–14.5)
GLUCOSE SERPL-MCNC: 91 MG/DL (ref 82–115)
HCT VFR BLD AUTO: 37.7 % (ref 38–47)
HDLC SERPL-MCNC: 76 MG/DL (ref 35–60)
HGB BLD-MCNC: 11.4 G/DL (ref 12–16)
IMM GRANULOCYTES # BLD AUTO: 0.02 K/UL (ref 0–0.04)
IMM GRANULOCYTES NFR BLD: 0.7 % (ref 0–0.4)
LDLC SERPL CALC-MCNC: 109 MG/DL
LDLC/HDLC SERPL: 1.4 {RATIO}
LYMPHOCYTES # BLD AUTO: 1.5 K/UL (ref 1–4.8)
LYMPHOCYTES NFR BLD AUTO: 50.5 % (ref 27–41)
MCH RBC QN AUTO: 29.8 PG (ref 27–31)
MCHC RBC AUTO-ENTMCNC: 30.2 G/DL (ref 32–36)
MCV RBC AUTO: 98.4 FL (ref 80–96)
MICROALBUMIN UR-MCNC: 2.1 MG/DL
MICROALBUMIN/CREAT RATIO PNL UR: 30 MG/G (ref 0–30)
MONOCYTES # BLD AUTO: 0.34 K/UL (ref 0–0.8)
MONOCYTES NFR BLD AUTO: 11.4 % (ref 2–6)
MPC BLD CALC-MCNC: 12.7 FL (ref 9.4–12.4)
NEUTROPHILS # BLD AUTO: 1.08 K/UL (ref 1.8–7.7)
NEUTROPHILS NFR BLD AUTO: 36.4 % (ref 53–65)
NONHDLC SERPL-MCNC: 125 MG/DL
NRBC # BLD AUTO: 0 X10E3/UL
NRBC, AUTO (.00): 0 %
PLATELET # BLD AUTO: 218 K/UL (ref 150–400)
POTASSIUM SERPL-SCNC: 3.8 MMOL/L (ref 3.5–5.1)
RBC # BLD AUTO: 3.83 M/UL (ref 4.2–5.4)
SODIUM SERPL-SCNC: 138 MMOL/L (ref 136–145)
TRIGL SERPL-MCNC: 79 MG/DL (ref 37–140)
VLDLC SERPL-MCNC: 16 MG/DL
WBC # BLD AUTO: 2.97 K/UL (ref 4.5–11)

## 2025-02-10 PROCEDURE — 1159F MED LIST DOCD IN RCRD: CPT | Mod: ,,, | Performed by: FAMILY MEDICINE

## 2025-02-10 PROCEDURE — 80048 BASIC METABOLIC PNL TOTAL CA: CPT | Mod: ,,, | Performed by: CLINICAL MEDICAL LABORATORY

## 2025-02-10 PROCEDURE — 99214 OFFICE O/P EST MOD 30 MIN: CPT | Mod: ,,, | Performed by: FAMILY MEDICINE

## 2025-02-10 PROCEDURE — 1160F RVW MEDS BY RX/DR IN RCRD: CPT | Mod: ,,, | Performed by: FAMILY MEDICINE

## 2025-02-10 PROCEDURE — 82570 ASSAY OF URINE CREATININE: CPT | Mod: ,,, | Performed by: CLINICAL MEDICAL LABORATORY

## 2025-02-10 PROCEDURE — 4010F ACE/ARB THERAPY RXD/TAKEN: CPT | Mod: ,,, | Performed by: FAMILY MEDICINE

## 2025-02-10 PROCEDURE — 80061 LIPID PANEL: CPT | Mod: ,,, | Performed by: CLINICAL MEDICAL LABORATORY

## 2025-02-10 PROCEDURE — 85025 COMPLETE CBC W/AUTO DIFF WBC: CPT | Mod: ,,, | Performed by: CLINICAL MEDICAL LABORATORY

## 2025-02-10 PROCEDURE — 3288F FALL RISK ASSESSMENT DOCD: CPT | Mod: ,,, | Performed by: FAMILY MEDICINE

## 2025-02-10 PROCEDURE — 3077F SYST BP >= 140 MM HG: CPT | Mod: ,,, | Performed by: FAMILY MEDICINE

## 2025-02-10 PROCEDURE — 3008F BODY MASS INDEX DOCD: CPT | Mod: ,,, | Performed by: FAMILY MEDICINE

## 2025-02-10 PROCEDURE — 82043 UR ALBUMIN QUANTITATIVE: CPT | Mod: ,,, | Performed by: CLINICAL MEDICAL LABORATORY

## 2025-02-10 PROCEDURE — 3078F DIAST BP <80 MM HG: CPT | Mod: ,,, | Performed by: FAMILY MEDICINE

## 2025-02-10 PROCEDURE — 1101F PT FALLS ASSESS-DOCD LE1/YR: CPT | Mod: ,,, | Performed by: FAMILY MEDICINE

## 2025-02-10 RX ORDER — OLMESARTAN MEDOXOMIL 40 MG/1
40 TABLET ORAL DAILY
Qty: 90 TABLET | Refills: 1 | Status: SHIPPED | OUTPATIENT
Start: 2025-02-10 | End: 2026-02-10

## 2025-02-10 RX ORDER — POTASSIUM CHLORIDE 750 MG/1
10 TABLET, EXTENDED RELEASE ORAL DAILY
Qty: 90 TABLET | Refills: 1 | Status: SHIPPED | OUTPATIENT
Start: 2025-02-10

## 2025-02-10 RX ORDER — CARVEDILOL 12.5 MG/1
12.5 TABLET ORAL 2 TIMES DAILY WITH MEALS
Qty: 180 TABLET | Refills: 1 | Status: SHIPPED | OUTPATIENT
Start: 2025-02-10 | End: 2026-02-10

## 2025-02-10 RX ORDER — CHLORTHALIDONE 25 MG/1
25 TABLET ORAL DAILY
Qty: 90 TABLET | Refills: 1 | Status: CANCELLED | OUTPATIENT
Start: 2025-02-10 | End: 2026-02-10

## 2025-02-10 RX ORDER — LEVOTHYROXINE SODIUM 88 UG/1
88 TABLET ORAL DAILY
Qty: 90 TABLET | Refills: 1 | Status: SHIPPED | OUTPATIENT
Start: 2025-02-10

## 2025-02-10 NOTE — PROGRESS NOTES
Myles Sweet MD    [address] [place] [address]     PATIENT NAME: Yoanna Hurtado  : 1955  DATE: 2/10/25  MRN: [id number]      Billing Provider: Myles Sweet MD  Level of Service: AK OFFICE/OUTPT VISIT, EST, LEVL IV, 30-39 MIN  Patient PCP Information       Provider PCP Type    Myles Sweet MD General            Reason for Visit / Chief Complaint: Hypertension (6 month follow up. States she wants to be checked for UTI.) and Health Maintenance (Denies vaccines at this time )       Update PCP  Update Chief Complaint         History of Present Illness / Problem Focused Workflow     Yoanna Hurtado presents to the clinic with Hypertension (6 month follow up. States she wants to be checked for UTI.) and Health Maintenance (Denies vaccines at this time )         HPI    Review of Systems     Review of Systems   Constitutional:  Negative for activity change, appetite change, chills, fatigue and fever.   HENT:  Negative for nasal congestion, ear pain, hearing loss, postnasal drip and sore throat.    Respiratory:  Negative for cough, chest tightness, shortness of breath and wheezing.    Cardiovascular:  Negative for chest pain, palpitations, leg swelling and claudication.   Gastrointestinal:  Negative for abdominal pain, change in bowel habit, constipation, diarrhea, nausea and vomiting.   Genitourinary:  Negative for dysuria.   Musculoskeletal:  Negative for arthralgias, back pain, gait problem and myalgias.   Integumentary:  Negative for rash.   Neurological:  Negative for weakness and headaches.   Psychiatric/Behavioral:  Negative for suicidal ideas. The patient is not nervous/anxious.         Medical / Social / Family History     Past Medical History:   Diagnosis Date    Hypertension     Hypothyroidism        Past Surgical History:   Procedure Laterality Date    HYSTERECTOMY      OOPHORECTOMY      TOTAL THYROIDECTOMY         Social History  Ms.   reports that she has never smoked. She has never been exposed to tobacco smoke. She has never used smokeless tobacco. She reports that she does not drink alcohol and does not use drugs.    Family History  Ms.'s family history is not on file.    Medications and Allergies     Medications  Outpatient Medications Marked as Taking for the 2/10/25 encounter (Office Visit) with Myles Sweet MD   Medication Sig Dispense Refill    calcium-vitamin D3 (OS-HORTENCIA 500 + D3) 500 mg-5 mcg (200 unit) per tablet Take 1 tablet by mouth once daily. 90 tablet 1    chlorthalidone (HYGROTEN) 25 MG Tab Take 1 tablet (25 mg total) by mouth once daily. For BLOOD PRESSURE 90 tablet 1    cholecalciferol, vitamin D3, 125 mcg (5,000 unit) Tab Take 5,000 Units by mouth once daily.      cranberry fruit concentrate (AZO CRANBERRY) 250 mg Chew Take 1 tablet by mouth 4 (four) times daily as needed (for URINE SYMPTOMS). 30 tablet 2    diclofenac sodium (VOLTAREN) 1 % Gel Apply a thin layer of gel to the left foot twice daily as needed for PAIN and INFLAMMATION 200 g 5    fluticasone propionate (FLONASE) 50 mcg/actuation nasal spray 1 spray (50 mcg total) by Each Nostril route once daily. 16 g 5    magnesium 250 mg Tab Take by mouth once.      [DISCONTINUED] carvediloL (COREG) 12.5 MG tablet Take 1 tablet (12.5 mg total) by mouth 2 (two) times daily with meals. For BLOOD PRESSURE 180 tablet 1    [DISCONTINUED] olmesartan (BENICAR) 40 MG tablet Take 1 tablet (40 mg total) by mouth once daily. For BLOOD PRESSURE 90 tablet 1    [DISCONTINUED] potassium chloride (KLOR-CON) 10 MEQ TbSR Take 1 tablet (10 mEq total) by mouth once daily. 90 tablet 1    [DISCONTINUED] SYNTHROID 88 mcg tablet Take 1 tablet (88 mcg total) by mouth once daily. For THYROID 90 tablet 1       Allergies  Review of patient's allergies indicates:   Allergen Reactions    Hydrochlorothiazide Other (See Comments)     Low potassium     Penicillins     Sulfa (sulfonamide antibiotics)         Physical Examination     Vitals:    02/10/25 0823   BP: (!) 162/75   Pulse:    Resp:    Temp:      Physical Exam  Constitutional:       Appearance: Normal appearance.   HENT:      Head: Normocephalic and atraumatic.   Eyes:      Extraocular Movements: Extraocular movements intact.   Cardiovascular:      Rate and Rhythm: Normal rate and regular rhythm.   Pulmonary:      Effort: Pulmonary effort is normal.      Breath sounds: Normal breath sounds.   Skin:     General: Skin is warm.   Neurological:      Mental Status: She is alert and oriented to person, place, and time. Mental status is at baseline.            Assessment and Plan (including Health Maintenance)      Problem List  Smart Sets  Document Outside HM   :    Plan:     Labs today    I looked at her blood pressure log from home, her medication, and we discussed options. She has previously taken amlodipine but stopped due to gingival hyperplasia. She is already taking a Beta Blocker and her pulse in 66. High dose benicar, 40mg daily. Also on 25 chlorthalidone. With amlodipine not being a good option, next would be hydralazine and we are not starting that today. She will start walking more and see if her numbers come down.     Health Maintenance Due   Topic Date Due    TETANUS VACCINE  Never done    Hemoglobin A1c (Diabetic Prevention Screening)  Never done    Shingles Vaccine (1 of 2) Never done    Pneumococcal Vaccines (Age 50+) (1 of 1 - PCV) Never done    RSV Vaccine (Age 60+ and Pregnant patients) (1 - Risk 60-74 years 1-dose series) Never done    COVID-19 Vaccine (4 - 2024-25 season) 09/01/2024    Lipid Panel  09/18/2024    DEXA Scan  12/13/2024       Problem List Items Addressed This Visit          ENT    Seasonal allergies (Chronic)       Cardiac/Vascular    Primary hypertension - Primary (Chronic)    Overview      - Goal blood pressure for most patients is less than 130/85. Both numbers are important. If you have questions about your specific goal  blood pressure, please ask at your clinic visits.   - Check blood pressure outside of clinic, record the numbers, and bring the log to all your office visits.   - If you have any chest pain, SOB, or other concerning symptoms, report these immediately, or go to the nearest ER.    - Recommend cardiovascular exercise, at least 3 times per week, for at least 15 minutes.   - Eat a heart healthy diet.            Relevant Medications    carvediloL (COREG) 12.5 MG tablet    olmesartan (BENICAR) 40 MG tablet    potassium chloride (KLOR-CON) 10 MEQ TbSR    Other Relevant Orders    Basic Metabolic Panel    CBC Auto Differential    Lipid Panel    Microalbumin/Creatinine Ratio, Urine    Urinalysis, Reflex to Urine Culture       Endocrine    Acquired hypothyroidism (Chronic)    Relevant Medications    SYNTHROID 88 mcg tablet    Vitamin D deficiency (Chronic)       Health Maintenance Topics with due status: Not Due       Topic Last Completion Date    Colorectal Cancer Screening 10/05/2015    Mammogram 12/24/2024       Procedures     Future Appointments   Date Time Provider Department Center   8/15/2025  9:00 AM [facility] NURSE, [facility] KAYLEIGH Morales   12/31/2025  8:40 AM [facility]        Follow up in about 6 months (around 8/10/2025) for chronic health problems, hypertension.     Signature:  Myles Sweet MD  [address] [place] [address]  Phone #: [phone]  Fax #: [phone]    Date of encounter: 2/10/25    There are no Patient Instructions on file for this visit.

## 2025-02-11 ENCOUNTER — TELEPHONE (OUTPATIENT)
Dept: FAMILY MEDICINE | Facility: CLINIC | Age: 70
End: 2025-02-11
Payer: MEDICARE

## 2025-02-11 DIAGNOSIS — D72.819 LEUKOPENIA, UNSPECIFIED TYPE: Primary | ICD-10-CM

## 2025-02-11 NOTE — TELEPHONE ENCOUNTER
----- Message from Myles Sweet MD sent at 2/11/2025 12:23 PM CST -----  White blood cell count has progressively lowered over the course of the past 3 years, ideally would obtain a peripheral blood smear    Otherwise acceptable labs

## 2025-04-07 DIAGNOSIS — I10 PRIMARY HYPERTENSION: ICD-10-CM

## 2025-04-07 RX ORDER — CHLORTHALIDONE 25 MG/1
25 TABLET ORAL DAILY
Qty: 90 TABLET | Refills: 1 | Status: SHIPPED | OUTPATIENT
Start: 2025-04-07 | End: 2026-04-07

## 2025-04-07 NOTE — TELEPHONE ENCOUNTER
ESMER MCARTHUR [[id number]] is asking for a refill of her chlorthalidone (HYGROTEN) 25 MG to be sent to [facility]

## 2025-05-10 ENCOUNTER — OFFICE VISIT (OUTPATIENT)
Dept: FAMILY MEDICINE | Facility: CLINIC | Age: 70
End: 2025-05-10
Payer: MEDICARE

## 2025-05-10 VITALS
SYSTOLIC BLOOD PRESSURE: 131 MMHG | OXYGEN SATURATION: 98 % | DIASTOLIC BLOOD PRESSURE: 79 MMHG | RESPIRATION RATE: 19 BRPM | HEART RATE: 92 BPM | WEIGHT: 152.38 LBS | TEMPERATURE: 97 F | BODY MASS INDEX: 25.39 KG/M2 | HEIGHT: 65 IN

## 2025-05-10 DIAGNOSIS — R30.0 DYSURIA: ICD-10-CM

## 2025-05-10 DIAGNOSIS — N30.91 CYSTITIS WITH HEMATURIA: Primary | ICD-10-CM

## 2025-05-10 DIAGNOSIS — R35.0 URINE FREQUENCY: ICD-10-CM

## 2025-05-10 LAB
BILIRUB SERPL-MCNC: NEGATIVE MG/DL
BILIRUB UR QL STRIP: NEGATIVE
BLOOD URINE, POC: ABNORMAL
CLARITY UR: CLEAR
CLARITY, UA: CLEAR
COLOR UR: ABNORMAL
COLOR, UA: YELLOW
GLUCOSE UR QL STRIP: NEGATIVE
GLUCOSE UR STRIP-MCNC: NORMAL MG/DL
KETONES UR QL STRIP: NEGATIVE
KETONES UR STRIP-SCNC: NEGATIVE MG/DL
LEUKOCYTE ESTERASE UR QL STRIP: NEGATIVE
LEUKOCYTE ESTERASE URINE, POC: ABNORMAL
NITRITE UR QL STRIP: NEGATIVE
NITRITE, POC UA: NEGATIVE
PH UR STRIP: 7.5 PH UNITS
PH, POC UA: 7
PROT UR QL STRIP: 20
PROTEIN, POC: 30
RBC # UR STRIP: NEGATIVE /UL
SP GR UR STRIP: 1.02
SPECIFIC GRAVITY, POC UA: 1.01
UROBILINOGEN UR STRIP-ACNC: NORMAL MG/DL
UROBILINOGEN, POC UA: 0.2

## 2025-05-10 PROCEDURE — 3008F BODY MASS INDEX DOCD: CPT | Mod: ,,,

## 2025-05-10 PROCEDURE — 81003 URINALYSIS AUTO W/O SCOPE: CPT | Mod: QW,,, | Performed by: CLINICAL MEDICAL LABORATORY

## 2025-05-10 PROCEDURE — 1126F AMNT PAIN NOTED NONE PRSNT: CPT | Mod: ,,,

## 2025-05-10 PROCEDURE — 1101F PT FALLS ASSESS-DOCD LE1/YR: CPT | Mod: ,,,

## 2025-05-10 PROCEDURE — 4010F ACE/ARB THERAPY RXD/TAKEN: CPT | Mod: ,,,

## 2025-05-10 PROCEDURE — 1159F MED LIST DOCD IN RCRD: CPT | Mod: ,,,

## 2025-05-10 PROCEDURE — 99213 OFFICE O/P EST LOW 20 MIN: CPT | Mod: ,,,

## 2025-05-10 PROCEDURE — 3288F FALL RISK ASSESSMENT DOCD: CPT | Mod: ,,,

## 2025-05-10 PROCEDURE — 3075F SYST BP GE 130 - 139MM HG: CPT | Mod: ,,,

## 2025-05-10 PROCEDURE — 3060F POS MICROALBUMINURIA REV: CPT | Mod: ,,,

## 2025-05-10 PROCEDURE — 1160F RVW MEDS BY RX/DR IN RCRD: CPT | Mod: ,,,

## 2025-05-10 PROCEDURE — 3078F DIAST BP <80 MM HG: CPT | Mod: ,,,

## 2025-05-10 PROCEDURE — 3066F NEPHROPATHY DOC TX: CPT | Mod: ,,,

## 2025-05-10 RX ORDER — NITROFURANTOIN 25; 75 MG/1; MG/1
100 CAPSULE ORAL 2 TIMES DAILY
Qty: 10 CAPSULE | Refills: 0 | Status: SHIPPED | OUTPATIENT
Start: 2025-05-10 | End: 2025-05-15

## 2025-05-10 RX ORDER — PHENAZOPYRIDINE HYDROCHLORIDE 200 MG/1
200 TABLET, FILM COATED ORAL 3 TIMES DAILY PRN
Qty: 10 TABLET | Refills: 0 | Status: SHIPPED | OUTPATIENT
Start: 2025-05-10

## 2025-05-10 NOTE — PROGRESS NOTES
Subjective     Patient ID: Yoanna Hurtado is a 69 y.o. female.    Chief Complaint: Flank Pain, Urinary Frequency (Started Thursday and not taking anything over the counter for symptoms ), and Dysuria    Flank Pain  Associated symptoms include dysuria.   Urinary Frequency   Associated symptoms include flank pain and frequency.   Dysuria   Associated symptoms include flank pain and frequency.     Review of Systems   Genitourinary:  Positive for dysuria, flank pain and frequency.          Objective     Physical Exam  Vitals and nursing note reviewed.   Constitutional:       Appearance: Normal appearance.   HENT:      Head: Normocephalic and atraumatic.   Eyes:      Pupils: Pupils are equal, round, and reactive to light.   Cardiovascular:      Rate and Rhythm: Normal rate and regular rhythm.      Pulses: Normal pulses.      Heart sounds: Normal heart sounds.   Pulmonary:      Effort: Pulmonary effort is normal.      Breath sounds: Normal breath sounds.   Abdominal:      General: Bowel sounds are normal.      Palpations: Abdomen is soft.   Skin:     General: Skin is warm.      Capillary Refill: Capillary refill takes less than 2 seconds.   Neurological:      General: No focal deficit present.      Mental Status: She is alert.   Psychiatric:         Mood and Affect: Mood normal.            Assessment and Plan     1. Cystitis with hematuria  -     Urine Culture High Risk  -     nitrofurantoin, macrocrystal-monohydrate, (MACROBID) 100 MG capsule; Take 1 capsule (100 mg total) by mouth 2 (two) times daily. for 5 days  Dispense: 10 capsule; Refill: 0  -     phenazopyridine (PYRIDIUM) 200 MG tablet; Take 1 tablet (200 mg total) by mouth 3 (three) times daily as needed for Pain.  Dispense: 10 tablet; Refill: 0    2. Dysuria  -     POCT URINALYSIS W/O SCOPE  -     Urinalysis, Reflex to Urine Culture; Future; Expected date: 05/10/2025  -     phenazopyridine (PYRIDIUM) 200 MG tablet; Take 1 tablet (200 mg total) by mouth 3 (three)  times daily as needed for Pain.  Dispense: 10 tablet; Refill: 0    3. Urine frequency  -     POCT URINALYSIS W/O SCOPE  -     Urinalysis, Reflex to Urine Culture; Future; Expected date: 05/10/2025        Will f/u with culture results          No follow-ups on file.

## 2025-05-12 ENCOUNTER — RESULTS FOLLOW-UP (OUTPATIENT)
Dept: FAMILY MEDICINE | Facility: CLINIC | Age: 70
End: 2025-05-12

## 2025-07-26 ENCOUNTER — OFFICE VISIT (OUTPATIENT)
Dept: FAMILY MEDICINE | Facility: CLINIC | Age: 70
End: 2025-07-26
Payer: MEDICARE

## 2025-07-26 VITALS
BODY MASS INDEX: 25.16 KG/M2 | SYSTOLIC BLOOD PRESSURE: 151 MMHG | HEART RATE: 82 BPM | TEMPERATURE: 98 F | OXYGEN SATURATION: 98 % | RESPIRATION RATE: 18 BRPM | WEIGHT: 151 LBS | HEIGHT: 65 IN | DIASTOLIC BLOOD PRESSURE: 87 MMHG

## 2025-07-26 DIAGNOSIS — I10 PRIMARY HYPERTENSION: Chronic | ICD-10-CM

## 2025-07-26 DIAGNOSIS — R30.0 DYSURIA: ICD-10-CM

## 2025-07-26 DIAGNOSIS — N30.90 CYSTITIS: Primary | ICD-10-CM

## 2025-07-26 DIAGNOSIS — J30.2 SEASONAL ALLERGIES: Chronic | ICD-10-CM

## 2025-07-26 DIAGNOSIS — E03.9 ACQUIRED HYPOTHYROIDISM: Chronic | ICD-10-CM

## 2025-07-26 DIAGNOSIS — R79.89 ABNORMAL CBC: ICD-10-CM

## 2025-07-26 LAB
BASOPHILS # BLD AUTO: 0.02 K/UL (ref 0–0.2)
BASOPHILS NFR BLD AUTO: 0.6 % (ref 0–1)
BILIRUB SERPL-MCNC: ABNORMAL MG/DL
BLOOD URINE, POC: ABNORMAL
CLARITY, UA: CLEAR
COLOR, UA: YELLOW
DIFFERENTIAL METHOD BLD: ABNORMAL
EOSINOPHIL # BLD AUTO: 0.03 K/UL (ref 0–0.5)
EOSINOPHIL NFR BLD AUTO: 0.8 % (ref 1–4)
ERYTHROCYTE [DISTWIDTH] IN BLOOD BY AUTOMATED COUNT: 13 % (ref 11.5–14.5)
GLUCOSE UR QL STRIP: ABNORMAL
HCT VFR BLD AUTO: 33.6 % (ref 38–47)
HGB BLD-MCNC: 10.7 G/DL (ref 12–16)
IMM GRANULOCYTES # BLD AUTO: 0.01 K/UL (ref 0–0.04)
IMM GRANULOCYTES NFR BLD: 0.3 % (ref 0–0.4)
KETONES UR QL STRIP: ABNORMAL
LEUKOCYTE ESTERASE URINE, POC: ABNORMAL
LYMPHOCYTES # BLD AUTO: 1.68 K/UL (ref 1–4.8)
LYMPHOCYTES NFR BLD AUTO: 46.4 % (ref 27–41)
MCH RBC QN AUTO: 29.7 PG (ref 27–31)
MCHC RBC AUTO-ENTMCNC: 31.8 G/DL (ref 32–36)
MCV RBC AUTO: 93.3 FL (ref 80–96)
MONOCYTES # BLD AUTO: 0.5 K/UL (ref 0–0.8)
MONOCYTES NFR BLD AUTO: 13.8 % (ref 2–6)
MPC BLD CALC-MCNC: 12.3 FL (ref 9.4–12.4)
NEUTROPHILS # BLD AUTO: 1.38 K/UL (ref 1.8–7.7)
NEUTROPHILS NFR BLD AUTO: 38.1 % (ref 53–65)
NITRITE, POC UA: ABNORMAL
NRBC # BLD AUTO: 0 X10E3/UL
NRBC, AUTO (.00): 0 %
PH, POC UA: 7
PLATELET # BLD AUTO: 203 K/UL (ref 150–400)
PROTEIN, POC: 30
RBC # BLD AUTO: 3.6 M/UL (ref 4.2–5.4)
SPECIFIC GRAVITY, POC UA: 1.02
T4 FREE SERPL-MCNC: 1.33 NG/DL (ref 0.7–1.48)
TSH SERPL DL<=0.005 MIU/L-ACNC: 0.23 UIU/ML (ref 0.35–4.94)
UROBILINOGEN, POC UA: 0.2
WBC # BLD AUTO: 3.62 K/UL (ref 4.5–11)

## 2025-07-26 PROCEDURE — 84439 ASSAY OF FREE THYROXINE: CPT | Mod: ,,, | Performed by: CLINICAL MEDICAL LABORATORY

## 2025-07-26 PROCEDURE — 87086 URINE CULTURE/COLONY COUNT: CPT | Mod: ,,, | Performed by: CLINICAL MEDICAL LABORATORY

## 2025-07-26 PROCEDURE — 3008F BODY MASS INDEX DOCD: CPT | Mod: ,,,

## 2025-07-26 PROCEDURE — 1126F AMNT PAIN NOTED NONE PRSNT: CPT | Mod: ,,,

## 2025-07-26 PROCEDURE — 3077F SYST BP >= 140 MM HG: CPT | Mod: ,,,

## 2025-07-26 PROCEDURE — 3288F FALL RISK ASSESSMENT DOCD: CPT | Mod: ,,,

## 2025-07-26 PROCEDURE — 1101F PT FALLS ASSESS-DOCD LE1/YR: CPT | Mod: ,,,

## 2025-07-26 PROCEDURE — 3079F DIAST BP 80-89 MM HG: CPT | Mod: ,,,

## 2025-07-26 PROCEDURE — 1160F RVW MEDS BY RX/DR IN RCRD: CPT | Mod: ,,,

## 2025-07-26 PROCEDURE — 85025 COMPLETE CBC W/AUTO DIFF WBC: CPT | Mod: ,,, | Performed by: CLINICAL MEDICAL LABORATORY

## 2025-07-26 PROCEDURE — 84443 ASSAY THYROID STIM HORMONE: CPT | Mod: ,,, | Performed by: CLINICAL MEDICAL LABORATORY

## 2025-07-26 PROCEDURE — 1159F MED LIST DOCD IN RCRD: CPT | Mod: ,,,

## 2025-07-26 PROCEDURE — 99214 OFFICE O/P EST MOD 30 MIN: CPT | Mod: ,,,

## 2025-07-26 PROCEDURE — 4010F ACE/ARB THERAPY RXD/TAKEN: CPT | Mod: ,,,

## 2025-07-26 PROCEDURE — 3060F POS MICROALBUMINURIA REV: CPT | Mod: ,,,

## 2025-07-26 PROCEDURE — 3066F NEPHROPATHY DOC TX: CPT | Mod: ,,,

## 2025-07-26 RX ORDER — NITROFURANTOIN 25; 75 MG/1; MG/1
100 CAPSULE ORAL 2 TIMES DAILY
Qty: 10 CAPSULE | Refills: 0 | Status: SHIPPED | OUTPATIENT
Start: 2025-07-26 | End: 2025-07-31

## 2025-07-26 RX ORDER — FLUTICASONE PROPIONATE 50 MCG
1 SPRAY, SUSPENSION (ML) NASAL DAILY
Qty: 16 G | Refills: 5 | Status: SHIPPED | OUTPATIENT
Start: 2025-07-26

## 2025-07-26 NOTE — PROGRESS NOTES
Subjective     Patient ID: Yoanna Hurtado is a 69 y.o. female.    Chief Complaint: Dysuria (She says she has flank pain on the L and some burning with urination that started approx 3 days ago.  ), Flank Pain, Medication Refill (She asks for refill of flonase due to nasal drainage), and Nasal Congestion    Dysuria   Associated symptoms include flank pain.   Flank Pain  Associated symptoms include dysuria.   Medication Refill      Review of Systems   Genitourinary:  Positive for dysuria and flank pain.          Objective     Physical Exam  Vitals and nursing note reviewed.   Constitutional:       Appearance: Normal appearance.   HENT:      Head: Normocephalic.      Right Ear: External ear normal.      Left Ear: External ear normal.      Nose: Nose normal.      Mouth/Throat:      Mouth: Mucous membranes are moist.      Pharynx: Oropharynx is clear.   Eyes:      Pupils: Pupils are equal, round, and reactive to light.   Cardiovascular:      Rate and Rhythm: Normal rate and regular rhythm.      Pulses: Normal pulses.      Heart sounds: Normal heart sounds.   Pulmonary:      Effort: Pulmonary effort is normal.      Breath sounds: Normal breath sounds.   Abdominal:      General: Bowel sounds are normal.      Palpations: Abdomen is soft.      Tenderness: There is left CVA tenderness.   Musculoskeletal:         General: No signs of injury.      Cervical back: No tenderness.   Skin:     General: Skin is warm.      Capillary Refill: Capillary refill takes less than 2 seconds.   Neurological:      General: No focal deficit present.      Mental Status: She is alert.   Psychiatric:         Mood and Affect: Mood normal.            Assessment and Plan     1. Cystitis  -     nitrofurantoin, macrocrystal-monohydrate, (MACROBID) 100 MG capsule; Take 1 capsule (100 mg total) by mouth 2 (two) times daily. for 5 days  Dispense: 10 capsule; Refill: 0  -     Urine Culture High Risk ($$)    2. Dysuria  -     POCT URINALYSIS W/O SCOPE    3.  Seasonal allergies  -     fluticasone propionate (FLONASE) 50 mcg/actuation nasal spray; 1 spray (50 mcg total) by Each Nostril route once daily.  Dispense: 16 g; Refill: 5    4. Acquired hypothyroidism  -     TSH; Future; Expected date: 07/26/2025  -     T4, Free; Future; Expected date: 07/26/2025    5. Primary hypertension  Overview:   - Goal blood pressure for most patients is less than 130/85. Both numbers are important. If you have questions about your specific goal blood pressure, please ask at your clinic visits.   - Check blood pressure outside of clinic, record the numbers, and bring the log to all your office visits.   - If you have any chest pain, SOB, or other concerning symptoms, report these immediately, or go to the nearest ER.    - Recommend cardiovascular exercise, at least 3 times per week, for at least 15 minutes.   - Eat a heart healthy diet.         6. Abnormal CBC  -     CBC Auto Differential; Future; Expected date: 07/26/2025  -     Path Review, Peripheral Smear; Future; Expected date: 07/26/2025        Patient endorses dysuria for the past 3 days.  Advised to increase fluid intake, AZ0 as needed while awaiting culture  will refill Flonase at patient's request, allergies are well managed when using  Patient has not had thyroid levels checked in over a year, requests check.  Patient denies any thyroid dysfunction symptoms  Patient has not taken all of her blood pressure medicines, she takes them throughout the day due to medications that have to be taken separately.  Advised to monitor blood pressure at home and keep follow-up appointment.  Blood pressure will still likely not look controlled in clinic due to patient not taking all of her scheduled medicines by appointment time, but with twice a day blood pressure results we should have some idea of how well her blood pressure is controlled at home         No follow-ups on file.

## 2025-07-28 LAB
PATH REV BLD -IMP: NORMAL
UA COMPLETE W REFLEX CULTURE PNL UR: NORMAL

## 2025-08-18 ENCOUNTER — OFFICE VISIT (OUTPATIENT)
Dept: FAMILY MEDICINE | Facility: CLINIC | Age: 70
End: 2025-08-18
Payer: MEDICARE

## 2025-08-18 VITALS
SYSTOLIC BLOOD PRESSURE: 136 MMHG | HEART RATE: 59 BPM | BODY MASS INDEX: 24.93 KG/M2 | WEIGHT: 149.63 LBS | OXYGEN SATURATION: 99 % | HEIGHT: 65 IN | TEMPERATURE: 98 F | RESPIRATION RATE: 14 BRPM | DIASTOLIC BLOOD PRESSURE: 70 MMHG

## 2025-08-18 DIAGNOSIS — Z12.11 SCREENING FOR MALIGNANT NEOPLASM OF COLON: ICD-10-CM

## 2025-08-18 DIAGNOSIS — E55.9 VITAMIN D DEFICIENCY: ICD-10-CM

## 2025-08-18 DIAGNOSIS — I10 PRIMARY HYPERTENSION: ICD-10-CM

## 2025-08-18 DIAGNOSIS — Z00.00 ENCOUNTER FOR SUBSEQUENT ANNUAL WELLNESS VISIT (AWV) IN MEDICARE PATIENT: Primary | ICD-10-CM

## 2025-08-18 DIAGNOSIS — Z13.1 DIABETES MELLITUS SCREENING: ICD-10-CM

## 2025-08-18 DIAGNOSIS — J30.2 SEASONAL ALLERGIES: ICD-10-CM

## 2025-08-18 DIAGNOSIS — E03.9 ACQUIRED HYPOTHYROIDISM: ICD-10-CM

## 2025-08-18 LAB
25(OH)D3 SERPL-MCNC: 76.6 NG/ML (ref 30–80)
EST. AVERAGE GLUCOSE BLD GHB EST-MCNC: 160 MG/DL
HBA1C MFR BLD HPLC: 7.2 %
TSH SERPL DL<=0.005 MIU/L-ACNC: 0.14 UIU/ML (ref 0.35–4.94)

## 2025-08-18 PROCEDURE — 4010F ACE/ARB THERAPY RXD/TAKEN: CPT | Mod: ,,,

## 2025-08-18 PROCEDURE — 1126F AMNT PAIN NOTED NONE PRSNT: CPT | Mod: ,,,

## 2025-08-18 PROCEDURE — 1101F PT FALLS ASSESS-DOCD LE1/YR: CPT | Mod: ,,,

## 2025-08-18 PROCEDURE — 3060F POS MICROALBUMINURIA REV: CPT | Mod: ,,,

## 2025-08-18 PROCEDURE — 83036 HEMOGLOBIN GLYCOSYLATED A1C: CPT | Mod: ,,, | Performed by: CLINICAL MEDICAL LABORATORY

## 2025-08-18 PROCEDURE — 84443 ASSAY THYROID STIM HORMONE: CPT | Mod: ,,, | Performed by: CLINICAL MEDICAL LABORATORY

## 2025-08-18 PROCEDURE — 3288F FALL RISK ASSESSMENT DOCD: CPT | Mod: ,,,

## 2025-08-18 PROCEDURE — 3075F SYST BP GE 130 - 139MM HG: CPT | Mod: ,,,

## 2025-08-18 PROCEDURE — 3078F DIAST BP <80 MM HG: CPT | Mod: ,,,

## 2025-08-18 PROCEDURE — 3051F HG A1C>EQUAL 7.0%<8.0%: CPT | Mod: ,,,

## 2025-08-18 PROCEDURE — 3066F NEPHROPATHY DOC TX: CPT | Mod: ,,,

## 2025-08-18 PROCEDURE — 1158F ADVNC CARE PLAN TLK DOCD: CPT | Mod: ,,,

## 2025-08-18 PROCEDURE — 1160F RVW MEDS BY RX/DR IN RCRD: CPT | Mod: ,,,

## 2025-08-18 PROCEDURE — 82306 VITAMIN D 25 HYDROXY: CPT | Mod: ,,, | Performed by: CLINICAL MEDICAL LABORATORY

## 2025-08-18 PROCEDURE — G0439 PPPS, SUBSEQ VISIT: HCPCS | Mod: ,,,

## 2025-08-18 PROCEDURE — 1159F MED LIST DOCD IN RCRD: CPT | Mod: ,,,

## 2025-08-18 RX ORDER — POTASSIUM CHLORIDE 750 MG/1
10 TABLET, EXTENDED RELEASE ORAL DAILY
Qty: 90 TABLET | Refills: 1 | Status: SHIPPED | OUTPATIENT
Start: 2025-08-18

## 2025-08-18 RX ORDER — CARVEDILOL 12.5 MG/1
12.5 TABLET ORAL 2 TIMES DAILY WITH MEALS
Qty: 180 TABLET | Refills: 1 | Status: SHIPPED | OUTPATIENT
Start: 2025-08-18 | End: 2026-08-18

## 2025-08-18 RX ORDER — CHLORTHALIDONE 25 MG/1
25 TABLET ORAL DAILY
Qty: 90 TABLET | Refills: 1 | Status: SHIPPED | OUTPATIENT
Start: 2025-08-18 | End: 2026-08-18

## 2025-08-18 RX ORDER — LEVOTHYROXINE SODIUM 88 UG/1
88 TABLET ORAL DAILY
Qty: 90 TABLET | Refills: 1 | Status: SHIPPED | OUTPATIENT
Start: 2025-08-18 | End: 2025-08-18

## 2025-08-18 RX ORDER — OLMESARTAN MEDOXOMIL 40 MG/1
40 TABLET ORAL DAILY
Qty: 90 TABLET | Refills: 1 | Status: SHIPPED | OUTPATIENT
Start: 2025-08-18 | End: 2026-08-18